# Patient Record
Sex: FEMALE | Race: BLACK OR AFRICAN AMERICAN | NOT HISPANIC OR LATINO | Employment: UNEMPLOYED | URBAN - METROPOLITAN AREA
[De-identification: names, ages, dates, MRNs, and addresses within clinical notes are randomized per-mention and may not be internally consistent; named-entity substitution may affect disease eponyms.]

---

## 2018-11-06 ENCOUNTER — IMMUNIZATION (OUTPATIENT)
Dept: PHARMACY | Facility: CLINIC | Age: 53
End: 2018-11-06

## 2020-03-06 NOTE — PROGRESS NOTES
"ANNUAL VISIT NOTE     PRESENTING HISTORY     Reason for Visit:  Annual visit.    No chief complaint on file.      History of Present Illness & ROS: Ms. Martha Zarate is a 54 y.o. female.  Here for an Annual.   New to this provider and clinic. Former patient of [facility], but "need to change to [facility] due to them no longer accepting my insurance". She has reportedly had her labs (routine annuals) in December with her former primary at  [facility]; checked her portal on her phone and noted to have a cholesterol of 235 in 10/2019. Not on "cholesterol" medication.     She is not having any new or acute issues.   She has chronic "arthritic" problems. Reportedly not being seen by a Rheumatologist at [facility].       Review of Systems:  Eyes: denies visual changes at this time denies floaters   ENT: no nasal congestion or sore throat  Respiratory: no cough or shorness of breath  Cardiovascular: no chest pain or palpitations  Gastrointestinal: no nausea or vomiting, no abdominal pain or change in bowel habits  Genitourinary: no hematuria or dysuria; denies frequency  Hematologic/Lymphatic: no easy bruising or lymphadenopathy  Musculoskeletal: no arthralgias or myalgias  Neurological: no seizures or tremors  Endocrine: no heat or cold intolerance    PAST HISTORY:     No past medical history on file.    Past Surgical History:   Procedure Laterality Date    TUBAL LIGATION         Family History   Problem Relation Age of Onset    Diabetes Father     Heart disease Father     Hypertension Father        Social History     Socioeconomic History    Marital status:      Spouse name: Not on file    Number of children: Not on file    Years of education: Not on file    Highest education level: Not on file   Occupational History    Not on file   Social Needs    Financial resource strain: Not on file    Food insecurity:     Worry: Not on file     Inability: Not on file    Transportation needs:     Medical: Not on file     " Non-medical: Not on file   Tobacco Use    Smoking status: Never Smoker   Substance and Sexual Activity    Alcohol use: No    Drug use: No    Sexual activity: Yes     Birth control/protection: None   Lifestyle    Physical activity:     Days per week: Not on file     Minutes per session: Not on file    Stress: Not on file   Relationships    Social connections:     Talks on phone: Not on file     Gets together: Not on file     Attends [employer] service: Not on file     Active member of club or organization: Not on file     Attends meetings of clubs or organizations: Not on file     Relationship status: Not on file   Other Topics Concern    Not on file   Social History Narrative    Not on file       MEDICATIONS & ALLERGIES:     Current Outpatient Medications on File Prior to Visit   Medication Sig Dispense Refill    flu vac [id number] 36mos up,PF, 60 mcg (15 mcg x 4)/0.5 mL Syrg to be administered by pharmacist 0.5 mL 0     No current facility-administered medications on file prior to visit.         Review of patient's allergies indicates:  No Known Allergies    Medications Reconciliation:   I have reconciled the patient's home medications and discharge medications with the patient/family. I have updated all changes.  Refer to After-Visit Medication List.    OBJECTIVE:     Vital Signs:  There were no vitals filed for this visit.  Wt Readings from Last 1 Encounters:   07/10/13 1346 104.3 kg (230 lb)     There is no height or weight on file to calculate BMI.     Wt Readings from Last 3 Encounters:   03/10/20 114.4 kg (252 lb 3.3 oz)   07/10/13 104.3 kg (230 lb)     Temp Readings from Last 3 Encounters:   07/10/13 98.2 °F (36.8 °C) (Oral)     BP Readings from Last 3 Encounters:   03/10/20 130/80   07/10/13 139/78     Pulse Readings from Last 3 Encounters:   03/10/20 60   07/10/13 92       Physical Exam:  General: Well developed, well nourished. No distress.  HEENT: Head is normocephalic, atraumatic; ears are  normal.   Eyes: Clear conjunctiva.  Neck: Supple, symmetrical neck; trachea midline.  Lungs: Clear to auscultation bilaterally and normal respiratory effort.  Cardiovascular: Heart with regular rate and rhythm. No murmurs, gallops or rubs  Extremities: No LE edema. Pulses 2+ and symmetric.   Abdomen: Abdomen is soft, non-tender non-distended with normal bowel sounds.  Skin: Skin color, texture, turgor normal. No rashes.  Musculoskeletal: Normal gait.   Lymph Nodes: No cervical or supraclavicular adenopathy.  Neurologic: Normal strength and tone. No focal numbness or weakness.   Psychiatric: Not depressed.    Laboratory  No results found for: WBC, HGB, HCT, PLT, CHOL, TRIG, HDL, LDLDIRECT, ALT, AST, NA, K, CL, CREATININE, BUN, CO2, TSH, PSA, INR, GLUF, HGBA1C, MICROALBUR        ASSESSMENT & PLAN:       Preventative health care  -     Comprehensive metabolic panel; Future; Expected date: 03/10/2020  -     CBC auto differential; Future; Expected date: 03/10/2020  -     Lipid panel; Future; Expected date: 03/10/2020  -     Hemoglobin A1c; Future; Expected date: 03/10/2020  -     Iron and TIBC; Future; Expected date: 03/10/2020  -     Ferritin; Future; Expected date: 03/10/2020    Iron deficiency anemia, unspecified iron deficiency anemia type:   ` continue Iron supplements daily (not taking consistently)  -     Iron and TIBC; Future; Expected date: 03/10/2020  -     Ferritin; Future; Expected date: 03/10/2020      Hyperlipidemia, unspecified hyperlipidemia type:   Noted on lab portal with [facility] and former primary that her Cholesterol level was 235   -     Lipid panel; Future; Expected date: 03/10/2020  *Taking [place]       Immunizations:   Shingrix Series: future   TDaP: order given       *Will need to schedule with one of our Primary Care Physician providers to be considered fully est'd in medical care.   Noted appt on 4/22/2020 with Dr. Langley     Future Appointments   Date Time Provider Department Center   4/22/2020   9:00 AM Layne Langley MD [facility]        Medication List           Accurate as of March 10, 2020 10:21 AM. If you have any questions, ask your nurse or doctor.               CONTINUE taking these medications    ferrous gluconate 324 MG tablet  Commonly known as:  FERGON     FLUZONE QUAD [id number] (PF) 60 mcg (15 mcg x 4)/0.5 mL Syrg  Generic drug:  flu vac [id number] 36mos up(PF)  to be administered by pharmacist          Signing Physician:  DAVE Gomez

## 2020-03-10 ENCOUNTER — OFFICE VISIT (OUTPATIENT)
Dept: INTERNAL MEDICINE | Facility: CLINIC | Age: 55
End: 2020-03-10
Payer: MEDICARE

## 2020-03-10 ENCOUNTER — IMMUNIZATION (OUTPATIENT)
Dept: PHARMACY | Facility: CLINIC | Age: 55
End: 2020-03-10
Payer: MEDICARE

## 2020-03-10 VITALS
HEART RATE: 60 BPM | DIASTOLIC BLOOD PRESSURE: 80 MMHG | HEIGHT: 65 IN | SYSTOLIC BLOOD PRESSURE: 130 MMHG | OXYGEN SATURATION: 95 % | BODY MASS INDEX: 42.02 KG/M2 | WEIGHT: 252.19 LBS

## 2020-03-10 DIAGNOSIS — R79.9 ABNORMAL FINDING OF BLOOD CHEMISTRY, UNSPECIFIED: ICD-10-CM

## 2020-03-10 DIAGNOSIS — D50.9 IRON DEFICIENCY ANEMIA, UNSPECIFIED IRON DEFICIENCY ANEMIA TYPE: ICD-10-CM

## 2020-03-10 DIAGNOSIS — E78.5 HYPERLIPIDEMIA, UNSPECIFIED HYPERLIPIDEMIA TYPE: ICD-10-CM

## 2020-03-10 DIAGNOSIS — Z00.00 PREVENTATIVE HEALTH CARE: Primary | ICD-10-CM

## 2020-03-10 PROCEDURE — 99386 PR PREVENTIVE VISIT,NEW,40-64: ICD-10-PCS | Mod: S$GLB,,, | Performed by: NURSE PRACTITIONER

## 2020-03-10 PROCEDURE — 99999 PR PBB SHADOW E&M-EST. PATIENT-LVL III: CPT | Mod: PBBFAC,,, | Performed by: NURSE PRACTITIONER

## 2020-03-10 PROCEDURE — 99999 PR PBB SHADOW E&M-EST. PATIENT-LVL III: ICD-10-PCS | Mod: PBBFAC,,, | Performed by: NURSE PRACTITIONER

## 2020-03-10 PROCEDURE — 99386 PREV VISIT NEW AGE 40-64: CPT | Mod: S$GLB,,, | Performed by: NURSE PRACTITIONER

## 2020-03-10 RX ORDER — FERROUS GLUCONATE 324(38)MG
TABLET ORAL
COMMUNITY
Start: 2019-12-04 | End: 2020-08-26 | Stop reason: SDUPTHER

## 2020-03-11 ENCOUNTER — TELEPHONE (OUTPATIENT)
Dept: INTERNAL MEDICINE | Facility: CLINIC | Age: 55
End: 2020-03-11

## 2020-03-11 NOTE — TELEPHONE ENCOUNTER
"JAMIR:   Start Iron tabs daily     Hypercholesteremia:   238 with high HDL  Suspect this is diet driven and have advised on diet and exercise with repeat in 3-4 months when follows up with "new" primary care provider, Dr. Langley to [facility].     SDJ        "

## 2020-03-12 ENCOUNTER — TELEPHONE (OUTPATIENT)
Dept: INTERNAL MEDICINE | Facility: CLINIC | Age: 55
End: 2020-03-12

## 2020-03-12 NOTE — TELEPHONE ENCOUNTER
Pt informed of results and to take ferrous sulfate 325mg daily OTC. Also informed pt to keep appt with Dr. Langley in April. Pt verbally understood.

## 2020-03-12 NOTE — TELEPHONE ENCOUNTER
"----- Message from DAVE Andrew sent at 3/11/2020  6:29 AM CDT -----    Patient does not have a portal set up.   Please call her with the following instructions from labs drawn on yesterday..    1) Iron Def Anemia:   Iron level 7`    ` start Ferrous Sulfate 325mg tab daily (over the counter) for a  Minimum of 3 months    2) Hypercholesteremia:   238 with high HDL  Suspect this is diet driven and adviseon diet and exercise with repeat in 3-4 months when follows up with "new" primary care provider, Dr. Langley to [facility].     Thanks     "

## 2020-06-03 ENCOUNTER — IMMUNIZATION (OUTPATIENT)
Dept: PHARMACY | Facility: CLINIC | Age: 55
End: 2020-06-03
Payer: MEDICARE

## 2020-06-15 PROBLEM — Z00.00 PREVENTATIVE HEALTH CARE: Status: RESOLVED | Noted: 2020-03-10 | Resolved: 2020-06-15

## 2020-06-18 ENCOUNTER — PATIENT OUTREACH (OUTPATIENT)
Dept: ADMINISTRATIVE | Facility: HOSPITAL | Age: 55
End: 2020-06-18

## 2020-08-26 ENCOUNTER — OFFICE VISIT (OUTPATIENT)
Dept: PRIMARY CARE CLINIC | Facility: CLINIC | Age: 55
End: 2020-08-26
Payer: MEDICARE

## 2020-08-26 VITALS
HEART RATE: 87 BPM | DIASTOLIC BLOOD PRESSURE: 72 MMHG | HEIGHT: 64 IN | BODY MASS INDEX: 43.77 KG/M2 | OXYGEN SATURATION: 99 % | TEMPERATURE: 99 F | SYSTOLIC BLOOD PRESSURE: 126 MMHG | WEIGHT: 256.38 LBS

## 2020-08-26 DIAGNOSIS — K59.03 DRUG-INDUCED CONSTIPATION: ICD-10-CM

## 2020-08-26 DIAGNOSIS — Z76.89 ESTABLISHING CARE WITH NEW DOCTOR, ENCOUNTER FOR: Primary | ICD-10-CM

## 2020-08-26 DIAGNOSIS — D50.8 IRON DEFICIENCY ANEMIA SECONDARY TO INADEQUATE DIETARY IRON INTAKE: ICD-10-CM

## 2020-08-26 DIAGNOSIS — M17.0 PRIMARY OSTEOARTHRITIS OF BOTH KNEES: ICD-10-CM

## 2020-08-26 DIAGNOSIS — Z53.20 HIV SCREENING DECLINED: ICD-10-CM

## 2020-08-26 DIAGNOSIS — E78.00 HYPERCHOLESTEREMIA: ICD-10-CM

## 2020-08-26 DIAGNOSIS — E66.01 CLASS 3 SEVERE OBESITY DUE TO EXCESS CALORIES WITHOUT SERIOUS COMORBIDITY WITH BODY MASS INDEX (BMI) OF 40.0 TO 44.9 IN ADULT: ICD-10-CM

## 2020-08-26 DIAGNOSIS — Z00.00 LABORATORY EXAM ORDERED AS PART OF ROUTINE GENERAL MEDICAL EXAMINATION: ICD-10-CM

## 2020-08-26 DIAGNOSIS — Z12.31 SCREENING MAMMOGRAM, ENCOUNTER FOR: ICD-10-CM

## 2020-08-26 DIAGNOSIS — Z86.39 PERSONAL HISTORY OF OTHER ENDOCRINE, NUTRITIONAL AND METABOLIC DISEASE: ICD-10-CM

## 2020-08-26 PROBLEM — K59.04 CHRONIC IDIOPATHIC CONSTIPATION: Status: ACTIVE | Noted: 2020-08-26

## 2020-08-26 PROBLEM — E66.813 CLASS 3 SEVERE OBESITY DUE TO EXCESS CALORIES WITHOUT SERIOUS COMORBIDITY WITH BODY MASS INDEX (BMI) OF 40.0 TO 44.9 IN ADULT: Status: ACTIVE | Noted: 2020-08-26

## 2020-08-26 PROCEDURE — 99999 PR PBB SHADOW E&M-EST. PATIENT-LVL V: ICD-10-PCS | Mod: PBBFAC,,, | Performed by: FAMILY MEDICINE

## 2020-08-26 PROCEDURE — 99499 UNLISTED E&M SERVICE: CPT | Mod: S$GLB,,, | Performed by: FAMILY MEDICINE

## 2020-08-26 PROCEDURE — 3008F PR BODY MASS INDEX (BMI) DOCUMENTED: ICD-10-PCS | Mod: CPTII,S$GLB,, | Performed by: FAMILY MEDICINE

## 2020-08-26 PROCEDURE — 3008F BODY MASS INDEX DOCD: CPT | Mod: CPTII,S$GLB,, | Performed by: FAMILY MEDICINE

## 2020-08-26 PROCEDURE — 99499 RISK ADDL DX/OHS AUDIT: ICD-10-PCS | Mod: S$GLB,,, | Performed by: FAMILY MEDICINE

## 2020-08-26 PROCEDURE — 99214 OFFICE O/P EST MOD 30 MIN: CPT | Mod: S$GLB,,, | Performed by: FAMILY MEDICINE

## 2020-08-26 PROCEDURE — 99214 PR OFFICE/OUTPT VISIT, EST, LEVL IV, 30-39 MIN: ICD-10-PCS | Mod: S$GLB,,, | Performed by: FAMILY MEDICINE

## 2020-08-26 PROCEDURE — 99999 PR PBB SHADOW E&M-EST. PATIENT-LVL V: CPT | Mod: PBBFAC,,, | Performed by: FAMILY MEDICINE

## 2020-08-26 RX ORDER — DOCUSATE SODIUM 100 MG/1
100 CAPSULE, LIQUID FILLED ORAL
COMMUNITY
Start: 2019-12-03 | End: 2020-08-26 | Stop reason: SDUPTHER

## 2020-08-26 RX ORDER — DOCUSATE SODIUM 100 MG/1
100 CAPSULE, LIQUID FILLED ORAL DAILY PRN
Qty: 90 CAPSULE | Refills: 3 | Status: SHIPPED | OUTPATIENT
Start: 2020-08-26 | End: 2021-08-26

## 2020-08-26 RX ORDER — FERROUS GLUCONATE 324(38)MG
324 TABLET ORAL
Qty: 90 TABLET | Refills: 3 | Status: SHIPPED | OUTPATIENT
Start: 2020-08-26 | End: 2021-03-16

## 2020-08-26 NOTE — PATIENT INSTRUCTIONS
Weight Management: Call [phone] or [phone] to schedule.       Making Bariatric Surgery Work for You    After bariatric surgery, success is in your hands. The changes you make need to be lifelong commitments. Follow any instructions you are given on nutrition and activity. Be aware that how you see yourself and how others see you may change. Turn to those close to you for support. They can help you adjust to your new life.  What to expect as you lose weight  Most likely, you will lose weight steadily for the first 6 to 12 months after surgery. The most rapid weight loss often happens during the first 6 months after surgery. Most patients lose over half their excess weight in the first year and a half. After that, you may gain a small amount of weight back. This is normal. Set realistic and meaningful goals for weight loss. Most likely, you wont reach your ideal weight. But youll reach a healthier weight.  Changing your eating habits  To stay healthy, you may be given guidelines such as:  · Choose high-protein foods to help prevent nutritional problems.  · Eat slowly. Take small bites. Chew each bite well before swallowing it.  · Stop eating when you feel satisfied. Try not to reach that full feeling. Doing so can stretch the bariatric surgical procedure and allow you to eat more.   · If you want to snack between scheduled meals, talk with your dietitian about healthy snack choices.  · Drink sugar-free liquids, such as water. Drink them between (not with) meals. Wait at least 30 to 60 minutes after meals before drinking liquids.  · Take vitamins as directed.  · Avoid fibrous foods, such as celery, string beans, and unprocessed meat.  · Avoid alcohol and carbonated drinks.  Having an active lifestyle  These tips can help you succeed:  · Choose a form of regular exercise you enjoy.  · Exercise at your own pace.  · Ask a friend to join you.  · Keep a record of your exercise activity in a calendar or  notebook. Some people find this a good way to track their progress and stay motivated.  Finding support  See your bariatric surgery team on a regular basis, especially during the first year after surgery.   You might talk to:  · Friends and family members.  · Other bariatric surgery patients. Often they know just what youre going through. You may find other patients through a support group at your bariatric surgery program. Or there may be a group in your local community.  · A mental health professional. If you spoke to one before surgery, you might seek him or her out again. Special counseling or classes may be suggested.  Resources  · American Society for Metabolic and Bariatric Surgerywww.asmbs.org  · National Heart, Lung, and Blood [place] Obesity Education Initiativewww.nhlbi.nih.gov/health/public/heart/obesity/lose_wt   Date Last Reviewed: 3/23/2016  © 9078-9708 [employer]. [address] [place] [address]. All rights reserved. This information is not intended as a substitute for professional medical care. Always follow your healthcare professional's instructions.        Deciding on Bariatric Surgery    Is excess weight affecting your life and your health? Bariatric surgery (also called obesity surgery) may help you reach a healthier weight. This surgery alters your digestive system. For the surgery to work, you must change your diet and lifestyle. In most cases, the surgery is not reversible. So if youre considering surgery, learn all you can about it before you decide. Bariatric surgery also has a number of potential risks and complications that you need to discuss with your surgeon.  Qualifying for surgery  Surgery is not for everyone. To qualify:  · You must have a BMI of 40 or more, or a BMI of 35 or more (see box below) plus a serious obesity-related health problem, such as type 2 diabetes, high blood pressure, or sleep apnea.  · You must be healthy enough to have  surgery.  · You may be required to have a psychological evaluation.  · You must have tried to lose weight by other means, such as dieting.  Setting realistic expectations  The goal of bariatric surgery is to help you lose over half of your excess weight. This can improve or prevent health problems. This surgery is not done for cosmetic reasons. Keep in mind that:  · Other weight-loss methods should be tried first. Lifestyle changes, behavioral modifications, and prescription medicines are initial choices. Surgery is only a choice if other methods dont work.  · Surgery is meant to be permanent. You will need to change how you eat for the rest of your life.  · You must commit to eating less and being more active after surgery. If you dont, you will not lose or keep off the weight.  · You wont reach a healthy weight right away. Most weight is lost steadily during the first year or two after surgery.  · Most likely, you wont lose all your excess weight. But you can reach a much healthier weight.  Obesity is measured by a formula called body mass index (BMI), which is based on your height and weight. A healthy BMI is about 18 to 25. A BMI of 30 or more signals obesity. A BMI of 40 or more reflects severe (morbid) obesity.   Resources  · American Society for Metabolic and Bariatric Surgerywww.asmbs.org  · National Heart, Lung, and Blood [place] Obesity Education Initiativewww.nhlbi.nih.gov/health/public/heart/obesity/lose_wt  Date Last Reviewed: 2/17/2016  © 0042-4052 [employer]. [address] [place] [address], [place] [address]. All rights reserved. This information is not intended as a substitute for professional medical care. Always follow your healthcare professional's instructions.        Losing Weight for Heart Health  Excess weight is a major risk factor for heart disease. Losing weight has many benefits including lowering your blood pressure, improving your cholesterol level, and decreasing your risk  for diseases such as diabetes and heart disease. It may help keep your arteries open so that your heart can get the oxygen-rich blood it needs. All in all, losing weight makes you healthier.     Exercise with a friend. When activity is fun, you're more likely to stick with it.   Calories and weight loss  · Calories are the fuel your body burns for energy. You get the calories you need from the food you eat. For healthy weight loss, women should eat at least 1,200 calories a day, men at least 1,500.  · When you eat more calories than you need, your body stores the extra calories as fat. One pound of fat equals 3,500 calories.  · To lose weight, try to reduce your total calorie intake by 500 calories. To do this, eat 250 calories less each day. Add activity to burn the other 250 calories. Walking 2.5 miles burns about 250 calories. Other more intense activities can burn more calories in the time you spend doing them, such as swimming and running. It is important to understand that reducing calorie intake is much more effective at weight loss than is exercise.  · Eat a variety of healthy foods to get the nutrients you need.  Tips for losing weight  · Drink 8 to 10 glasses of water a day.  · Dont skip meals. Instead, eat smaller portions.  · Eat your meals earlier in the day.  · Cut out sugary drinks such as soda and fruit juices.  · Make your later meals lighter than your earlier meals.   Brisk activity is best  Brisk activity gets your heart pumping faster and it makes it healthier. Its also a great way to burn calories. In fact, your body may keep burning calories for hours after you stop a brisk activity:  · Begin by walking 10 minutes most days.  · Add more time and speed to your walk. Build up as you feel able.  · Aim for 3 to 4 sessions of aerobic exercise a week. Each session should last about 40 minutes and include moderate to vigorous physical activity.  · The most important part of the activity is that you  break a sweat. This indicates your heart is working hard enough to burn fat.  Date Last Reviewed: 6/1/2016 © 2000-2017 [employer]. [address] [place] [address], [place] [address]. All rights reserved. This information is not intended as a substitute for professional medical care. Always follow your healthcare professional's instructions.        Weight Management: Exercise and Activity    Studies show that people who exercise are the most likely to lose weight and keep it off. Exercise burns calories. It helps build muscle to make your body stronger. Make exercise an important part of your weight-management plan.  Make activity part of your day  You may not think you have the time to exercise. But you can work activity into your daily life--you just need to be committed. Take 10 minutes out of your lunch hour to take a walk. Walk to the [employer] to get your paper instead of having it delivered. Make it a habit to take the stairs instead of the elevator. Park in a far away parking spot instead of the closest. Youll be surprised at how fast these little changes can make a difference.  Some people really cannot walk very far, and tire out quickly with exercise. Instead of becoming discouraged, resolve to do what you can do, and work to make that a regular frequent habit.   The benefits of exercise  Exercise offers many benefits including:   · Exercise increases your metabolism (the speed at which your body burns calories).  · Regular exercise can increase the amount of muscle in your body. Muscle burns calories faster than fat. The more muscle you have, the more calories you burn.  · Exercise gives you energy and curbs your appetite.  · Exercise decreases stress and helps you sleep better.  Make exercise fun  Exercise can be fun. Choose an activity you enjoy. You may even get a friend to do it with you:  · Take a resistance-training or aerobics class  · Join a team sport  · Take a dance class  · Walk the  dog  · Ride a bike  If you have health problems, be sure to ask your healthcare provider before you start an exercise program. Have a  help you develop a plan thats safe for you.   Date Last Reviewed: 2/4/2016 © 2000-2017 [employer]. [address] [place] [address]. All rights reserved. This information is not intended as a substitute for professional medical care. Always follow your healthcare professional's instructions.        Finding Your Ideal Weight  Most of the people in magazines and on TV are far slimmer than average, yet this is the ideal that many people aim for. Before you decide that you wont be happy until you get down to a certain number of pounds, consider:    · Your age. You probably wish you could get back to your college weight. But normal changes in metabolism and hormone levels that occur with aging can make this more difficult.  · Your gender. In general, men have more muscle and heavier bones than women, which means that healthy men usually weigh more than healthy women of the same height.  · Your current weight. If you are very heavy, focus on losing a smaller amount (such as 10 percent of your body weight). Losing just 5 to 10 pounds can improve your health.  Your body fat percentage  A pound of muscle weighs the same as a pound of fat, but it takes up less space. Think of a trained athlete and a couch potato. Even though they may be the same height and weight, the athlete looks fitter, is healthier, and probably wears a smaller size of clothing. If you are muscular, a body fat test may be a more accurate measure of your ideal weight than the bathroom scale. Talk to your healthcare provider, who can help you set appropriate goals for yourself.  Body mass index (BMI)  Your body mass index is a number calculated from your weight and height. It is a fairly reliable indivactor of body fatness for most people. To calculate your BMI, see this BMI  calculator from the CDC: http://www.cdc.gov/healthyweight/assessing/bmi/adult_bmi/english_bmi_calculator/bmi_calculator.html  Date Last Reviewed: 6/1/2015 © 2000-2017 [employer]. [address] [place] [address]. All rights reserved. This information is not intended as a substitute for professional medical care. Always follow your healthcare professional's instructions.        Understanding Food and Cholesterol  Having a high cholesterol level puts you at risk for heart disease and other health problems. What you eat has a big effect on your bodys cholesterol level. Eating certain foods can raise your cholesterol. Other foods can help you lower it. Watching what you eat can help you get your cholesterol level under control.  Know which foods are high in saturated fat and trans fat  Foods high in saturated fat and trans fat can raise your LDL (bad) cholesterol. Its important to knowing which foods are high in these fats, and eat less of them. This can help you manage your cholesterol levels.  Foods high in these fats are:  · Animal products, including beef, lamb, pork, and poultry with skin on  · Cold cuts, guthrie, sausage  · Creamy sauces and fatty gravies  · Cookies, donuts, muffins, and pastries  · Fried foods  · Shortening, butter, coconut oil, palm oil, cocoa butter, partially hydrogenated oils (read labels)  · High-fat dairy products such as whole milk, cheese, cream cheese, and ice cream  Better choices:  · Lean beef, skinless white-meat poultry, fish  · Tomato sauce, vegetable puree  · Dried fruit, bagels, bread with jam  · Baked, broiled, steamed, or roasted foods  · Soft (tub) margarine, canola oil, and olive oil in moderate amounts  · Low-fat or nonfat dairy products, such as 1% or fat-free milk, and reduced-fat cheese  Use fiber to help control cholesterol  Foods high in fiber can help you keep your cholesterol down. Good sources of fiber are:  · Oats  · Barley  · Whole  grains  · Beans  · Vegetables  · Cornmeal  · Popcorn  · Berries, apples, other fruits  Date Last Reviewed: 8/10/2015  © 9577-6727 [employer], [employer]. [address] [place] [address], [place] [address]. All rights reserved. This information is not intended as a substitute for professional medical care. Always follow your healthcare professional's instructions.

## 2020-08-26 NOTE — PROGRESS NOTES
Subjective:       Patient ID: Martha Zarate is a 54 y.o. female.    Chief Complaint: [facility]      55 yo female presents to establish care. She states that she is on disability for arthritis of knee. She has iron deficiency anemia and needs refill on iron. Take colace for drug induced constipation on iron replacement. Reports that her periods are not consistently heavy. She follows routinely with gynecology.    The following portions of the patient's history were reviewed and updated as appropriate: allergies, current medications, past family history, past medical history, past social history, past surgical history and problem list.    Review of Systems   Constitutional: Negative for activity change, appetite change, chills, diaphoresis, fatigue, fever and unexpected weight change.   HENT: Negative for nasal congestion, dental problem, facial swelling, nosebleeds, postnasal drip, rhinorrhea, sore throat, tinnitus and trouble swallowing.    Eyes: Negative for pain, discharge, itching and visual disturbance.   Respiratory: Negative for apnea, chest tightness, shortness of breath, wheezing and stridor.    Cardiovascular: Negative for chest pain, palpitations and leg swelling.   Gastrointestinal: Negative for abdominal distention, abdominal pain, constipation, diarrhea, nausea, rectal pain and vomiting.   Endocrine: Negative for cold intolerance, heat intolerance and polyuria.   Genitourinary: Negative for difficulty urinating, dysuria, frequency, hematuria and urgency.   Musculoskeletal: Negative for arthralgias, gait problem, myalgias, neck pain and neck stiffness.   Integumentary:  Negative for color change and rash.   Neurological: Negative for dizziness, tremors, seizures, syncope, facial asymmetry, weakness and headaches.   Hematological: Negative for adenopathy. Does not bruise/bleed easily.   Psychiatric/Behavioral: Negative for agitation, confusion, hallucinations, self-injury and suicidal ideas. The  "patient is not hyperactive.           Objective:       Vitals:    08/26/20 0850   BP: 126/72   Pulse: 87   Temp: 98.6 °F (37 °C)   TempSrc: Oral   SpO2: 99%   Weight: 116.3 kg (256 lb 6.3 oz)   Height: 5' 4" (1.626 m)     Physical Exam  Constitutional:       General: She is not in acute distress.     Appearance: She is well-developed. She is obese. She is not diaphoretic.   HENT:      Head: Normocephalic and atraumatic.      Right Ear: External ear normal.      Left Ear: External ear normal.   Eyes:      General: No scleral icterus.        Right eye: No discharge.         Left eye: No discharge.      Conjunctiva/sclera: Conjunctivae normal.      Pupils: Pupils are equal, round, and reactive to light.   Neck:      Musculoskeletal: Normal range of motion and neck supple.      Thyroid: No thyromegaly.   Cardiovascular:      Rate and Rhythm: Normal rate and regular rhythm.      Heart sounds: Normal heart sounds. No murmur. No friction rub. No gallop.    Pulmonary:      Effort: Pulmonary effort is normal. No respiratory distress.      Breath sounds: Normal breath sounds.   Chest:      Chest wall: No tenderness.   Abdominal:      General: Bowel sounds are normal. There is no distension.      Palpations: Abdomen is soft. There is no mass.      Tenderness: There is no abdominal tenderness. There is no guarding or rebound.   Musculoskeletal: Normal range of motion.      Comments: No swelling or deformity of upper or lower extremity particular knee joints.   Lymphadenopathy:      Cervical: No cervical adenopathy.   Skin:     General: Skin is warm.      Capillary Refill: Capillary refill takes less than 2 seconds.      Findings: No rash.   Neurological:      Mental Status: She is alert and oriented to person, place, and time.      Cranial Nerves: No cranial nerve deficit.      Motor: No abnormal muscle tone.      Coordination: Coordination normal.      Deep Tendon Reflexes: Reflexes normal.   Psychiatric:         Thought " Content: Thought content normal.         Judgment: Judgment normal.         Assessment:       1. Establishing care with new doctor, encounter for    2. Primary osteoarthritis of both knees    3. Class 3 severe obesity due to excess calories without serious comorbidity with body mass index (BMI) of 40.0 to 44.9 in adult    4. Drug-induced constipation    5. Iron deficiency anemia secondary to inadequate dietary iron intake    6. Hypercholesteremia    7. Personal history of other endocrine, nutritional and metabolic disease     8. Laboratory exam ordered as part of routine general medical examination    9. HIV screening declined    10. Screening mammogram, encounter for        Plan:         1. Establishing care with new doctor, encounter for  The patient has been advised of the reasonably likely side effects and complications of medications and treatment.  Medications were reviewed and reconciled with refills sent to pharmacy as needed. Health maintenance was reviewed with recommendations per age and sex.  Immunizations reviewed and updated per guidelines unless patient declines. All questions were addressed and answered. Patient has contact information to get hold of us as needed and is encouraged to use the patient portal system.      2. Primary osteoarthritis of both knees  Activity modification. Heat/ cold therapy; topical Biofreeze, Aspercreme, Lidocaine OTC patches, Diclofenac gel. Tylenol and/or NSAIDs as needed. Careful with GI and renal adverse events with NSAIDs. Weight loss advised.  Exercises for strengthening and increased range of motion; supervised vs home vs combination physical therapy. May consider imaging and/or specialist consultation if symptoms do not improve.    She does not ambulate with assistance.    3. Class 3 severe obesity due to excess calories without serious comorbidity with body mass index (BMI) of 40.0 to 44.9 in adult  -     Ambulatory referral/consult to Weight Management Program;  Future  -     CBC Without Differential; Future  The importance of optimum diet & exercise discussed. The goals for weight management of 5-10 % of total body weight reduction in 3 months addressed.     The consumption of a reduced calorie diet, frequent self-weighing, and participation in a lifestyle intervention program are strategies to help maintain weight loss. Bariatric surgery, which may alter the body's adipose tissue set point, and extended use of anti-obesity pharmacologic therapy may help address these underlying physiologic changes.     4. Drug induced constipation  -     docusate sodium (COLACE) 100 MG capsule; Take 1 capsule (100 mg total) by mouth daily as needed for Constipation.  Dispense: 90 capsule; Refill: 3    5. Iron deficiency anemia secondary to inadequate dietary iron intake  -     Iron and TIBC; Future  -     Ferritin; Future  -     ferrous gluconate (FERGON) 324 MG tablet; Take 1 tablet (324 mg total) by mouth daily with breakfast.  Dispense: 90 tablet; Refill: 3    6. Hypercholesteremia  -     Lipid Panel; Future    7. Personal history of other endocrine, nutritional and metabolic disease   -     Hemoglobin A1C; Future  -     TSH; Future    8. Laboratory exam ordered as part of routine general medical examination  -     Comprehensive metabolic panel; Future  -     Hemoglobin A1C; Future    9. HIV screening declined  No high risk behavior.    10. Screening mammogram, encounter for  -     Mammo Digital Screening Bilat w/ Rigoberto; Future; Expected date: 01/16/2021    Annual physical on schedule for March, 2021, return sooner if any concerns.  Attempt to get colonoscopy records; Colonoscopy from [facility] or ask PCP Dr. Pamela Calle at [facility] [place]     Disclaimer: This note has been generated using voice-recognition software. There may be typographical errors that have been missed during proof-reading

## 2020-08-28 ENCOUNTER — PATIENT OUTREACH (OUTPATIENT)
Dept: ADMINISTRATIVE | Facility: HOSPITAL | Age: 55
End: 2020-08-28

## 2020-08-28 NOTE — LETTER
AUTHORIZATION FOR RELEASE OF   CONFIDENTIAL INFORMATION    Dear Dr. Sheridan,    We are seeing Martha Zarate, date of birth 1965, in the clinic at [facility]. Gertrude Kaplan MD is the patient's PCP. Martha Zarate has an outstanding lab/procedure at the time we reviewed her chart. In order to help keep her health information updated, she has authorized us to request the following medical record(s):        (  )  MAMMOGRAM                                      ( X )  COLONOSCOPY      (  )  PAP SMEAR                                          (  )  OUTSIDE LAB RESULTS     (  )  DEXA SCAN                                          (  )  EYE EXAM            (  )  FOOT EXAM                                          (  )  ENTIRE RECORD     (  )  OUTSIDE IMMUNIZATIONS                 (  )  _______________         Please fax records to [facility], Tenille Ottley-Sharpe, MD, [phone]     If you have any questions, please contact Naz at [phone].           Patient Name: Martha Zarate  : 1965  Patient Phone #: [phone]

## 2020-10-30 ENCOUNTER — PATIENT MESSAGE (OUTPATIENT)
Dept: ADMINISTRATIVE | Facility: HOSPITAL | Age: 55
End: 2020-10-30

## 2020-12-07 ENCOUNTER — PATIENT MESSAGE (OUTPATIENT)
Dept: ADMINISTRATIVE | Facility: HOSPITAL | Age: 55
End: 2020-12-07

## 2021-01-16 ENCOUNTER — LAB VISIT (OUTPATIENT)
Dept: LAB | Facility: HOSPITAL | Age: 56
End: 2021-01-16
Attending: FAMILY MEDICINE
Payer: MEDICARE

## 2021-01-16 DIAGNOSIS — Z12.12 ENCOUNTER FOR COLORECTAL CANCER SCREENING: ICD-10-CM

## 2021-01-16 DIAGNOSIS — Z12.11 ENCOUNTER FOR COLORECTAL CANCER SCREENING: ICD-10-CM

## 2021-01-16 PROCEDURE — 82274 ASSAY TEST FOR BLOOD FECAL: CPT

## 2021-01-19 ENCOUNTER — HOSPITAL ENCOUNTER (OUTPATIENT)
Dept: RADIOLOGY | Facility: HOSPITAL | Age: 56
Discharge: HOME | End: 2021-01-19
Attending: FAMILY MEDICINE
Payer: MEDICARE

## 2021-01-19 DIAGNOSIS — Z12.31 SCREENING MAMMOGRAM, ENCOUNTER FOR: ICD-10-CM

## 2021-01-19 PROCEDURE — 77067 MAMMO DIGITAL SCREENING BILAT WITH TOMOSYNTHESIS_CAD: ICD-10-PCS | Mod: 26,,, | Performed by: RADIOLOGY

## 2021-01-19 PROCEDURE — 77063 MAMMO DIGITAL SCREENING BILAT WITH TOMOSYNTHESIS_CAD: ICD-10-PCS | Mod: 26,,, | Performed by: RADIOLOGY

## 2021-01-19 PROCEDURE — 77063 BREAST TOMOSYNTHESIS BI: CPT | Mod: 26,,, | Performed by: RADIOLOGY

## 2021-01-19 PROCEDURE — 77067 SCR MAMMO BI INCL CAD: CPT | Mod: 26,,, | Performed by: RADIOLOGY

## 2021-01-19 PROCEDURE — 77067 SCR MAMMO BI INCL CAD: CPT | Mod: TC,PN

## 2021-01-28 ENCOUNTER — PATIENT OUTREACH (OUTPATIENT)
Dept: ADMINISTRATIVE | Facility: HOSPITAL | Age: 56
End: 2021-01-28

## 2021-01-28 DIAGNOSIS — Z12.12 ENCOUNTER FOR COLORECTAL CANCER SCREENING: Primary | ICD-10-CM

## 2021-01-28 DIAGNOSIS — Z12.11 ENCOUNTER FOR COLORECTAL CANCER SCREENING: Primary | ICD-10-CM

## 2021-02-03 LAB — HEMOCCULT STL QL IA: NEGATIVE

## 2021-02-04 ENCOUNTER — TELEPHONE (OUTPATIENT)
Dept: PRIMARY CARE CLINIC | Facility: CLINIC | Age: 56
End: 2021-02-04

## 2021-03-01 ENCOUNTER — PATIENT OUTREACH (OUTPATIENT)
Dept: ADMINISTRATIVE | Facility: OTHER | Age: 56
End: 2021-03-01

## 2021-03-02 ENCOUNTER — PATIENT OUTREACH (OUTPATIENT)
Dept: ADMINISTRATIVE | Facility: HOSPITAL | Age: 56
End: 2021-03-02

## 2021-03-03 ENCOUNTER — CLINICAL SUPPORT (OUTPATIENT)
Dept: URGENT CARE | Facility: CLINIC | Age: 56
End: 2021-03-03
Payer: MEDICARE

## 2021-03-03 ENCOUNTER — OFFICE VISIT (OUTPATIENT)
Dept: OPTOMETRY | Facility: CLINIC | Age: 56
End: 2021-03-03
Payer: MEDICARE

## 2021-03-03 DIAGNOSIS — H52.4 HYPEROPIA WITH PRESBYOPIA OF RIGHT EYE: Primary | ICD-10-CM

## 2021-03-03 DIAGNOSIS — H52.01 HYPEROPIA WITH PRESBYOPIA OF RIGHT EYE: Primary | ICD-10-CM

## 2021-03-03 DIAGNOSIS — H52.202 MYOPIA WITH ASTIGMATISM AND PRESBYOPIA, LEFT: ICD-10-CM

## 2021-03-03 DIAGNOSIS — H52.4 MYOPIA WITH ASTIGMATISM AND PRESBYOPIA, LEFT: ICD-10-CM

## 2021-03-03 DIAGNOSIS — Z20.822 EXPOSURE TO COVID-19 VIRUS: Primary | ICD-10-CM

## 2021-03-03 DIAGNOSIS — H25.813 COMBINED FORM OF AGE-RELATED CATARACT, BOTH EYES: ICD-10-CM

## 2021-03-03 DIAGNOSIS — H04.123 DRY EYE SYNDROME OF BOTH EYES: ICD-10-CM

## 2021-03-03 DIAGNOSIS — H52.12 MYOPIA WITH ASTIGMATISM AND PRESBYOPIA, LEFT: ICD-10-CM

## 2021-03-03 LAB
CTP QC/QA: YES
SARS-COV-2 RDRP RESP QL NAA+PROBE: NEGATIVE

## 2021-03-03 PROCEDURE — U0002 COVID-19 LAB TEST NON-CDC: HCPCS | Mod: QW,S$GLB,, | Performed by: STUDENT IN AN ORGANIZED HEALTH CARE EDUCATION/TRAINING PROGRAM

## 2021-03-03 PROCEDURE — 99999 PR PBB SHADOW E&M-EST. PATIENT-LVL II: ICD-10-PCS | Mod: PBBFAC,,, | Performed by: OPTOMETRIST

## 2021-03-03 PROCEDURE — U0002: ICD-10-PCS | Mod: QW,S$GLB,, | Performed by: STUDENT IN AN ORGANIZED HEALTH CARE EDUCATION/TRAINING PROGRAM

## 2021-03-03 PROCEDURE — 1126F PR PAIN SEVERITY QUANTIFIED, NO PAIN PRESENT: ICD-10-PCS | Mod: S$GLB,,, | Performed by: OPTOMETRIST

## 2021-03-03 PROCEDURE — 92004 COMPRE OPH EXAM NEW PT 1/>: CPT | Mod: S$GLB,,, | Performed by: OPTOMETRIST

## 2021-03-03 PROCEDURE — 92015 PR REFRACTION: ICD-10-PCS | Mod: S$GLB,,, | Performed by: OPTOMETRIST

## 2021-03-03 PROCEDURE — 92015 DETERMINE REFRACTIVE STATE: CPT | Mod: S$GLB,,, | Performed by: OPTOMETRIST

## 2021-03-03 PROCEDURE — 92004 PR EYE EXAM, NEW PATIENT,COMPREHESV: ICD-10-PCS | Mod: S$GLB,,, | Performed by: OPTOMETRIST

## 2021-03-03 PROCEDURE — 1126F AMNT PAIN NOTED NONE PRSNT: CPT | Mod: S$GLB,,, | Performed by: OPTOMETRIST

## 2021-03-03 PROCEDURE — 99999 PR PBB SHADOW E&M-EST. PATIENT-LVL II: CPT | Mod: PBBFAC,,, | Performed by: OPTOMETRIST

## 2021-03-09 ENCOUNTER — LAB VISIT (OUTPATIENT)
Dept: LAB | Facility: HOSPITAL | Age: 56
End: 2021-03-09
Attending: FAMILY MEDICINE
Payer: MEDICARE

## 2021-03-09 DIAGNOSIS — Z00.00 LABORATORY EXAM ORDERED AS PART OF ROUTINE GENERAL MEDICAL EXAMINATION: ICD-10-CM

## 2021-03-09 DIAGNOSIS — E78.00 HYPERCHOLESTEREMIA: ICD-10-CM

## 2021-03-09 DIAGNOSIS — E66.01 CLASS 3 SEVERE OBESITY DUE TO EXCESS CALORIES WITHOUT SERIOUS COMORBIDITY WITH BODY MASS INDEX (BMI) OF 40.0 TO 44.9 IN ADULT: ICD-10-CM

## 2021-03-09 DIAGNOSIS — D50.8 IRON DEFICIENCY ANEMIA SECONDARY TO INADEQUATE DIETARY IRON INTAKE: ICD-10-CM

## 2021-03-09 DIAGNOSIS — Z86.39 PERSONAL HISTORY OF OTHER ENDOCRINE, NUTRITIONAL AND METABOLIC DISEASE: ICD-10-CM

## 2021-03-09 LAB
ALBUMIN SERPL BCP-MCNC: 3.6 G/DL (ref 3.5–5.2)
ALP SERPL-CCNC: 76 U/L (ref 55–135)
ALT SERPL W/O P-5'-P-CCNC: 13 U/L (ref 10–44)
ANION GAP SERPL CALC-SCNC: 7 MMOL/L (ref 8–16)
AST SERPL-CCNC: 17 U/L (ref 10–40)
BILIRUB SERPL-MCNC: 0.3 MG/DL (ref 0.1–1)
BUN SERPL-MCNC: 13 MG/DL (ref 6–20)
CALCIUM SERPL-MCNC: 8.8 MG/DL (ref 8.7–10.5)
CHLORIDE SERPL-SCNC: 107 MMOL/L (ref 95–110)
CHOLEST SERPL-MCNC: 220 MG/DL (ref 120–199)
CHOLEST/HDLC SERPL: 3 {RATIO} (ref 2–5)
CO2 SERPL-SCNC: 26 MMOL/L (ref 23–29)
CREAT SERPL-MCNC: 0.9 MG/DL (ref 0.5–1.4)
ERYTHROCYTE [DISTWIDTH] IN BLOOD BY AUTOMATED COUNT: 14.6 % (ref 11.5–14.5)
EST. GFR  (AFRICAN AMERICAN): >60 ML/MIN/1.73 M^2
EST. GFR  (NON AFRICAN AMERICAN): >60 ML/MIN/1.73 M^2
ESTIMATED AVG GLUCOSE: 97 MG/DL (ref 68–131)
FERRITIN SERPL-MCNC: 9 NG/ML (ref 20–300)
GLUCOSE SERPL-MCNC: 94 MG/DL (ref 70–110)
HBA1C MFR BLD: 5 % (ref 4–5.6)
HCT VFR BLD AUTO: 36.1 % (ref 37–48.5)
HDLC SERPL-MCNC: 73 MG/DL (ref 40–75)
HDLC SERPL: 33.2 % (ref 20–50)
HGB BLD-MCNC: 11.2 G/DL (ref 12–16)
IRON SERPL-MCNC: 55 UG/DL (ref 30–160)
LDLC SERPL CALC-MCNC: 131.6 MG/DL (ref 63–159)
MCH RBC QN AUTO: 29.6 PG (ref 27–31)
MCHC RBC AUTO-ENTMCNC: 31 G/DL (ref 32–36)
MCV RBC AUTO: 96 FL (ref 82–98)
NONHDLC SERPL-MCNC: 147 MG/DL
PLATELET # BLD AUTO: 275 K/UL (ref 150–350)
PMV BLD AUTO: 10.5 FL (ref 9.2–12.9)
POTASSIUM SERPL-SCNC: 4.6 MMOL/L (ref 3.5–5.1)
PROT SERPL-MCNC: 7.2 G/DL (ref 6–8.4)
RBC # BLD AUTO: 3.78 M/UL (ref 4–5.4)
SATURATED IRON: 15 % (ref 20–50)
SODIUM SERPL-SCNC: 140 MMOL/L (ref 136–145)
TOTAL IRON BINDING CAPACITY: 371 UG/DL (ref 250–450)
TRANSFERRIN SERPL-MCNC: 251 MG/DL (ref 200–375)
TRIGL SERPL-MCNC: 77 MG/DL (ref 30–150)
TSH SERPL DL<=0.005 MIU/L-ACNC: 1.02 UIU/ML (ref 0.4–4)
WBC # BLD AUTO: 4.49 K/UL (ref 3.9–12.7)

## 2021-03-09 PROCEDURE — 36415 COLL VENOUS BLD VENIPUNCTURE: CPT | Mod: PN | Performed by: FAMILY MEDICINE

## 2021-03-09 PROCEDURE — 84443 ASSAY THYROID STIM HORMONE: CPT | Performed by: FAMILY MEDICINE

## 2021-03-09 PROCEDURE — 83540 ASSAY OF IRON: CPT | Performed by: FAMILY MEDICINE

## 2021-03-09 PROCEDURE — 80061 LIPID PANEL: CPT | Performed by: FAMILY MEDICINE

## 2021-03-09 PROCEDURE — 80053 COMPREHEN METABOLIC PANEL: CPT | Performed by: FAMILY MEDICINE

## 2021-03-09 PROCEDURE — 83036 HEMOGLOBIN GLYCOSYLATED A1C: CPT | Performed by: FAMILY MEDICINE

## 2021-03-09 PROCEDURE — 82728 ASSAY OF FERRITIN: CPT | Performed by: FAMILY MEDICINE

## 2021-03-09 PROCEDURE — 85027 COMPLETE CBC AUTOMATED: CPT | Performed by: FAMILY MEDICINE

## 2021-03-10 ENCOUNTER — TELEPHONE (OUTPATIENT)
Dept: PRIMARY CARE CLINIC | Facility: CLINIC | Age: 56
End: 2021-03-10

## 2021-03-16 ENCOUNTER — OFFICE VISIT (OUTPATIENT)
Dept: PRIMARY CARE CLINIC | Facility: CLINIC | Age: 56
End: 2021-03-16
Payer: MEDICARE

## 2021-03-16 VITALS
OXYGEN SATURATION: 99 % | WEIGHT: 254.88 LBS | BODY MASS INDEX: 43.51 KG/M2 | SYSTOLIC BLOOD PRESSURE: 120 MMHG | HEART RATE: 87 BPM | DIASTOLIC BLOOD PRESSURE: 78 MMHG | HEIGHT: 64 IN | TEMPERATURE: 98 F

## 2021-03-16 DIAGNOSIS — D50.8 IRON DEFICIENCY ANEMIA SECONDARY TO INADEQUATE DIETARY IRON INTAKE: ICD-10-CM

## 2021-03-16 DIAGNOSIS — M15.9 PRIMARY OSTEOARTHRITIS INVOLVING MULTIPLE JOINTS: ICD-10-CM

## 2021-03-16 DIAGNOSIS — E66.01 CLASS 3 SEVERE OBESITY DUE TO EXCESS CALORIES WITH SERIOUS COMORBIDITY AND BODY MASS INDEX (BMI) OF 40.0 TO 44.9 IN ADULT: ICD-10-CM

## 2021-03-16 DIAGNOSIS — K59.03 DRUG-INDUCED CONSTIPATION: ICD-10-CM

## 2021-03-16 DIAGNOSIS — E78.00 HYPERCHOLESTEREMIA: ICD-10-CM

## 2021-03-16 DIAGNOSIS — Z00.00 ANNUAL PHYSICAL EXAM: Primary | ICD-10-CM

## 2021-03-16 PROCEDURE — 99999 PR PBB SHADOW E&M-EST. PATIENT-LVL IV: CPT | Mod: PBBFAC,,, | Performed by: FAMILY MEDICINE

## 2021-03-16 PROCEDURE — 99499 RISK ADDL DX/OHS AUDIT: ICD-10-PCS | Mod: S$GLB,,, | Performed by: FAMILY MEDICINE

## 2021-03-16 PROCEDURE — 1125F AMNT PAIN NOTED PAIN PRSNT: CPT | Mod: S$GLB,,, | Performed by: FAMILY MEDICINE

## 2021-03-16 PROCEDURE — 3008F BODY MASS INDEX DOCD: CPT | Mod: CPTII,S$GLB,, | Performed by: FAMILY MEDICINE

## 2021-03-16 PROCEDURE — 99214 OFFICE O/P EST MOD 30 MIN: CPT | Mod: S$GLB,,, | Performed by: FAMILY MEDICINE

## 2021-03-16 PROCEDURE — 1125F PR PAIN SEVERITY QUANTIFIED, PAIN PRESENT: ICD-10-PCS | Mod: S$GLB,,, | Performed by: FAMILY MEDICINE

## 2021-03-16 PROCEDURE — 99499 UNLISTED E&M SERVICE: CPT | Mod: S$GLB,,, | Performed by: FAMILY MEDICINE

## 2021-03-16 PROCEDURE — 3008F PR BODY MASS INDEX (BMI) DOCUMENTED: ICD-10-PCS | Mod: CPTII,S$GLB,, | Performed by: FAMILY MEDICINE

## 2021-03-16 PROCEDURE — 99999 PR PBB SHADOW E&M-EST. PATIENT-LVL IV: ICD-10-PCS | Mod: PBBFAC,,, | Performed by: FAMILY MEDICINE

## 2021-03-16 PROCEDURE — 99214 PR OFFICE/OUTPT VISIT, EST, LEVL IV, 30-39 MIN: ICD-10-PCS | Mod: S$GLB,,, | Performed by: FAMILY MEDICINE

## 2021-03-16 RX ORDER — CYCLOBENZAPRINE HCL 10 MG
10 TABLET ORAL 3 TIMES DAILY PRN
Qty: 30 TABLET | Refills: 2 | Status: SHIPPED | OUTPATIENT
Start: 2021-03-16 | End: 2023-03-29 | Stop reason: ALTCHOICE

## 2021-04-26 ENCOUNTER — PATIENT MESSAGE (OUTPATIENT)
Dept: RESEARCH | Facility: HOSPITAL | Age: 56
End: 2021-04-26

## 2021-05-13 ENCOUNTER — PATIENT OUTREACH (OUTPATIENT)
Dept: ADMINISTRATIVE | Facility: HOSPITAL | Age: 56
End: 2021-05-13

## 2022-01-12 ENCOUNTER — TELEPHONE (OUTPATIENT)
Dept: PRIMARY CARE CLINIC | Facility: CLINIC | Age: 57
End: 2022-01-12
Payer: MEDICARE

## 2022-01-12 DIAGNOSIS — Z12.31 ENCOUNTER FOR SCREENING MAMMOGRAM FOR BREAST CANCER: Primary | ICD-10-CM

## 2022-01-18 ENCOUNTER — PES CALL (OUTPATIENT)
Dept: ADMINISTRATIVE | Facility: CLINIC | Age: 57
End: 2022-01-18
Payer: MEDICARE

## 2022-01-20 ENCOUNTER — HOSPITAL ENCOUNTER (OUTPATIENT)
Dept: RADIOLOGY | Facility: HOSPITAL | Age: 57
Discharge: HOME | End: 2022-01-20
Attending: FAMILY MEDICINE
Payer: MEDICARE

## 2022-01-20 VITALS — BODY MASS INDEX: 43.36 KG/M2 | HEIGHT: 64 IN | WEIGHT: 254 LBS

## 2022-01-20 DIAGNOSIS — Z12.31 ENCOUNTER FOR SCREENING MAMMOGRAM FOR BREAST CANCER: ICD-10-CM

## 2022-01-20 PROCEDURE — 77067 SCR MAMMO BI INCL CAD: CPT | Mod: TC,PN

## 2022-01-20 PROCEDURE — 77063 MAMMO DIGITAL SCREENING BILAT WITH TOMO: ICD-10-PCS | Mod: 26,,, | Performed by: RADIOLOGY

## 2022-01-20 PROCEDURE — 77067 MAMMO DIGITAL SCREENING BILAT WITH TOMO: ICD-10-PCS | Mod: 26,,, | Performed by: RADIOLOGY

## 2022-01-20 PROCEDURE — 77063 BREAST TOMOSYNTHESIS BI: CPT | Mod: TC,PN

## 2022-01-20 PROCEDURE — 77067 SCR MAMMO BI INCL CAD: CPT | Mod: 26,,, | Performed by: RADIOLOGY

## 2022-01-20 PROCEDURE — 77063 BREAST TOMOSYNTHESIS BI: CPT | Mod: 26,,, | Performed by: RADIOLOGY

## 2022-03-15 ENCOUNTER — OFFICE VISIT (OUTPATIENT)
Dept: HOME HEALTH SERVICES | Facility: CLINIC | Age: 57
End: 2022-03-15
Payer: MEDICARE

## 2022-03-15 VITALS
HEIGHT: 64 IN | HEART RATE: 72 BPM | TEMPERATURE: 97 F | BODY MASS INDEX: 43.54 KG/M2 | DIASTOLIC BLOOD PRESSURE: 77 MMHG | SYSTOLIC BLOOD PRESSURE: 130 MMHG | WEIGHT: 255 LBS

## 2022-03-15 DIAGNOSIS — D50.8 IRON DEFICIENCY ANEMIA SECONDARY TO INADEQUATE DIETARY IRON INTAKE: ICD-10-CM

## 2022-03-15 DIAGNOSIS — Z00.00 ENCOUNTER FOR PREVENTIVE HEALTH EXAMINATION: Primary | ICD-10-CM

## 2022-03-15 DIAGNOSIS — E66.01 MORBID OBESITY WITH BMI OF 40.0-44.9, ADULT: ICD-10-CM

## 2022-03-15 DIAGNOSIS — E78.00 HYPERCHOLESTEREMIA: ICD-10-CM

## 2022-03-15 PROCEDURE — 99499 UNLISTED E&M SERVICE: CPT | Mod: S$GLB,,, | Performed by: NURSE PRACTITIONER

## 2022-03-15 PROCEDURE — 1159F MED LIST DOCD IN RCRD: CPT | Mod: CPTII,S$GLB,, | Performed by: NURSE PRACTITIONER

## 2022-03-15 PROCEDURE — 3075F PR MOST RECENT SYSTOLIC BLOOD PRESS GE 130-139MM HG: ICD-10-PCS | Mod: CPTII,S$GLB,, | Performed by: NURSE PRACTITIONER

## 2022-03-15 PROCEDURE — G0439 PR MEDICARE ANNUAL WELLNESS SUBSEQUENT VISIT: ICD-10-PCS | Mod: S$GLB,,, | Performed by: NURSE PRACTITIONER

## 2022-03-15 PROCEDURE — 99499 RISK ADDL DX/OHS AUDIT: ICD-10-PCS | Mod: S$GLB,,, | Performed by: NURSE PRACTITIONER

## 2022-03-15 PROCEDURE — 3075F SYST BP GE 130 - 139MM HG: CPT | Mod: CPTII,S$GLB,, | Performed by: NURSE PRACTITIONER

## 2022-03-15 PROCEDURE — 3078F PR MOST RECENT DIASTOLIC BLOOD PRESSURE < 80 MM HG: ICD-10-PCS | Mod: CPTII,S$GLB,, | Performed by: NURSE PRACTITIONER

## 2022-03-15 PROCEDURE — G0439 PPPS, SUBSEQ VISIT: HCPCS | Mod: S$GLB,,, | Performed by: NURSE PRACTITIONER

## 2022-03-15 PROCEDURE — 1159F PR MEDICATION LIST DOCUMENTED IN MEDICAL RECORD: ICD-10-PCS | Mod: CPTII,S$GLB,, | Performed by: NURSE PRACTITIONER

## 2022-03-15 PROCEDURE — 1160F PR REVIEW ALL MEDS BY PRESCRIBER/CLIN PHARMACIST DOCUMENTED: ICD-10-PCS | Mod: CPTII,S$GLB,, | Performed by: NURSE PRACTITIONER

## 2022-03-15 PROCEDURE — 1160F RVW MEDS BY RX/DR IN RCRD: CPT | Mod: CPTII,S$GLB,, | Performed by: NURSE PRACTITIONER

## 2022-03-15 PROCEDURE — 3008F PR BODY MASS INDEX (BMI) DOCUMENTED: ICD-10-PCS | Mod: CPTII,S$GLB,, | Performed by: NURSE PRACTITIONER

## 2022-03-15 PROCEDURE — 3078F DIAST BP <80 MM HG: CPT | Mod: CPTII,S$GLB,, | Performed by: NURSE PRACTITIONER

## 2022-03-15 PROCEDURE — 3008F BODY MASS INDEX DOCD: CPT | Mod: CPTII,S$GLB,, | Performed by: NURSE PRACTITIONER

## 2022-03-15 NOTE — PATIENT INSTRUCTIONS
Counseling and Referral of Other Preventative  (Italic type indicates deductible and co-insurance are waived)    Patient Name: Martha Zarate  Today's Date: 3/15/2022    Health Maintenance       Date Due Completion Date    COVID-19 Vaccine (3 - Booster for Moderna series) 09/23/2021 4/23/2021    Colorectal Cancer Screening 02/01/2022 2/1/2021    Mammogram 01/20/2023 1/20/2022    TETANUS VACCINE 01/01/2025 1/1/2015    Cervical Cancer Screening 12/08/2025 12/8/2020    Lipid Panel 03/09/2026 3/9/2021        No orders of the defined types were placed in this encounter.    The following information is provided to all patients.  This information is to help you find resources for any of the problems found today that may be affecting your health:                Living healthy guide: www.[facility].[place].gov      Understanding Diabetes: www.diabetes.org      Eating healthy: www.cdc.gov/healthyweight      CDC home safety checklist: www.cdc.gov/steadi/patient.html      Agency on Aging: www.goea.[place].gov      Alcoholics anonymous (AA): www.aa.org      Physical Activity: www.yudy.nih.gov/xf8zljk      Tobacco use: www.quitwithusla.org

## 2022-03-15 NOTE — PROGRESS NOTES
"  Martha Zarate presented for a  Medicare AWV and comprehensive Health Risk Assessment today. The following components were reviewed and updated:    · Medical history  · Family History  · Social history  · Allergies and Current Medications  · Health Risk Assessment  · Health Maintenance  · Care Team         ** See Completed Assessments for Annual Wellness Visit within the encounter summary.**         The following assessments were completed:  · Living Situation  · CAGE  · Depression Screening  · Timed Get Up and Go  · Whisper Test  · Cognitive Function Screening  ·   ·   ·   · Nutrition Screening  · ADL Screening  · PAQ Screening        Vitals:    03/15/22 1204   BP: 130/77   Pulse: 72   Temp: 96.9 °F (36.1 °C)   Weight: 115.7 kg (255 lb)   Height: 5' 4" (1.626 m)     Body mass index is 43.77 kg/m².  Physical Exam  Constitutional:       Appearance: She is obese.   HENT:      Head: Normocephalic and atraumatic.      Nose: Nose normal.      Mouth/Throat:      Mouth: Mucous membranes are moist.   Eyes:      Extraocular Movements: Extraocular movements intact.   Cardiovascular:      Rate and Rhythm: Normal rate and regular rhythm.      Heart sounds: Normal heart sounds.   Pulmonary:      Effort: Pulmonary effort is normal. No respiratory distress.      Breath sounds: Normal breath sounds.   Abdominal:      General: Bowel sounds are normal. There is no distension.      Palpations: Abdomen is soft.   Musculoskeletal:         General: No swelling. Normal range of motion.      Cervical back: Normal range of motion.   Skin:     General: Skin is warm and dry.   Neurological:      General: No focal deficit present.      Mental Status: She is alert and oriented to person, place, and time.   Psychiatric:         Mood and Affect: Mood normal.         Behavior: Behavior normal.               Diagnoses and health risks identified today and associated recommendations/orders:    1. Encounter for preventive health " examination  Assessments completed. Preventive measures and health maintenance reviewed with patient.    2. Morbid obesity with BMI of 40.0-44.9, adult  Stable, followed by PCP. Diet and exercise encouraged.    3. Hypercholesteremia  Stable, followed by PCP.    4. Iron deficiency anemia secondary to inadequate dietary iron intake  Stable, followed by PCP.    Provided Martha with a 5-10 year written screening schedule and personal prevention plan. Recommendations were developed using the USPSTF age appropriate recommendations. Education, counseling, and referrals were provided as needed. After Visit Summary printed and given to patient which includes a list of additional screenings\tests needed.    Follow up in about 1 year (around 3/15/2023) for your next annual wellness visit.    Jeaneth Meade, TODD  I offered to discuss advanced care planning, including how to pick a person who would make decisions for you if you were unable to make them for yourself, called a health care power of , and what kind of decisions you might make such as use of life sustaining treatments such as ventilators and tube feeding when faced with a life limiting illness recorded on a living will that they will need to know. (How you want to be cared for as you near the end of your natural life)     X Patient is interested in learning more about how to make advanced directives.  I provided them paperwork and offered to discuss this with them.

## 2022-03-28 ENCOUNTER — OFFICE VISIT (OUTPATIENT)
Dept: PRIMARY CARE CLINIC | Facility: CLINIC | Age: 57
End: 2022-03-28
Payer: MEDICARE

## 2022-03-28 VITALS
HEIGHT: 64 IN | TEMPERATURE: 98 F | WEIGHT: 260.56 LBS | HEART RATE: 92 BPM | OXYGEN SATURATION: 98 % | BODY MASS INDEX: 44.48 KG/M2 | DIASTOLIC BLOOD PRESSURE: 74 MMHG | SYSTOLIC BLOOD PRESSURE: 120 MMHG

## 2022-03-28 DIAGNOSIS — Z00.00 ANNUAL PHYSICAL EXAM: Primary | ICD-10-CM

## 2022-03-28 DIAGNOSIS — E78.00 HYPERCHOLESTEREMIA: ICD-10-CM

## 2022-03-28 DIAGNOSIS — Z86.39 PERSONAL HISTORY OF OTHER ENDOCRINE, NUTRITIONAL AND METABOLIC DISEASE: ICD-10-CM

## 2022-03-28 DIAGNOSIS — E66.01 CLASS 3 SEVERE OBESITY DUE TO EXCESS CALORIES WITH SERIOUS COMORBIDITY AND BODY MASS INDEX (BMI) OF 40.0 TO 44.9 IN ADULT: ICD-10-CM

## 2022-03-28 DIAGNOSIS — D50.8 IRON DEFICIENCY ANEMIA SECONDARY TO INADEQUATE DIETARY IRON INTAKE: ICD-10-CM

## 2022-03-28 DIAGNOSIS — Z12.11 SCREENING FOR MALIGNANT NEOPLASM OF COLON: ICD-10-CM

## 2022-03-28 DIAGNOSIS — Z00.00 LABORATORY EXAM ORDERED AS PART OF ROUTINE GENERAL MEDICAL EXAMINATION: ICD-10-CM

## 2022-03-28 DIAGNOSIS — M15.9 PRIMARY OSTEOARTHRITIS INVOLVING MULTIPLE JOINTS: ICD-10-CM

## 2022-03-28 DIAGNOSIS — Z13.6 ENCOUNTER FOR LIPID SCREENING FOR CARDIOVASCULAR DISEASE: ICD-10-CM

## 2022-03-28 DIAGNOSIS — Z13.220 ENCOUNTER FOR LIPID SCREENING FOR CARDIOVASCULAR DISEASE: ICD-10-CM

## 2022-03-28 PROBLEM — M15.0 PRIMARY OSTEOARTHRITIS INVOLVING MULTIPLE JOINTS: Status: ACTIVE | Noted: 2022-03-28

## 2022-03-28 PROCEDURE — 99999 PR PBB SHADOW E&M-EST. PATIENT-LVL III: CPT | Mod: PBBFAC,,, | Performed by: FAMILY MEDICINE

## 2022-03-28 PROCEDURE — 99999 PR PBB SHADOW E&M-EST. PATIENT-LVL III: ICD-10-PCS | Mod: PBBFAC,,, | Performed by: FAMILY MEDICINE

## 2022-03-28 PROCEDURE — 99214 PR OFFICE/OUTPT VISIT, EST, LEVL IV, 30-39 MIN: ICD-10-PCS | Mod: S$GLB,,, | Performed by: FAMILY MEDICINE

## 2022-03-28 PROCEDURE — 1159F PR MEDICATION LIST DOCUMENTED IN MEDICAL RECORD: ICD-10-PCS | Mod: CPTII,S$GLB,, | Performed by: FAMILY MEDICINE

## 2022-03-28 PROCEDURE — 3078F PR MOST RECENT DIASTOLIC BLOOD PRESSURE < 80 MM HG: ICD-10-PCS | Mod: CPTII,S$GLB,, | Performed by: FAMILY MEDICINE

## 2022-03-28 PROCEDURE — 1160F RVW MEDS BY RX/DR IN RCRD: CPT | Mod: CPTII,S$GLB,, | Performed by: FAMILY MEDICINE

## 2022-03-28 PROCEDURE — 3008F PR BODY MASS INDEX (BMI) DOCUMENTED: ICD-10-PCS | Mod: CPTII,S$GLB,, | Performed by: FAMILY MEDICINE

## 2022-03-28 PROCEDURE — 99214 OFFICE O/P EST MOD 30 MIN: CPT | Mod: S$GLB,,, | Performed by: FAMILY MEDICINE

## 2022-03-28 PROCEDURE — 1160F PR REVIEW ALL MEDS BY PRESCRIBER/CLIN PHARMACIST DOCUMENTED: ICD-10-PCS | Mod: CPTII,S$GLB,, | Performed by: FAMILY MEDICINE

## 2022-03-28 PROCEDURE — 3078F DIAST BP <80 MM HG: CPT | Mod: CPTII,S$GLB,, | Performed by: FAMILY MEDICINE

## 2022-03-28 PROCEDURE — 1159F MED LIST DOCD IN RCRD: CPT | Mod: CPTII,S$GLB,, | Performed by: FAMILY MEDICINE

## 2022-03-28 PROCEDURE — 3008F BODY MASS INDEX DOCD: CPT | Mod: CPTII,S$GLB,, | Performed by: FAMILY MEDICINE

## 2022-03-28 PROCEDURE — 3074F SYST BP LT 130 MM HG: CPT | Mod: CPTII,S$GLB,, | Performed by: FAMILY MEDICINE

## 2022-03-28 PROCEDURE — 3074F PR MOST RECENT SYSTOLIC BLOOD PRESSURE < 130 MM HG: ICD-10-PCS | Mod: CPTII,S$GLB,, | Performed by: FAMILY MEDICINE

## 2022-03-28 NOTE — PROGRESS NOTES
Subjective:       Patient ID: Marhta Zarate is a 56 y.o. female.    Chief Complaint: Annual Exam      55 yo female presents for annual physical exam.    She has history of self reported arthritis- hands, elbows, knees; chronic lower back pain. PMH iron deficiency anemia and takes a multivitamin with iron; obesity, hypercholesterolemia.    No swelling, no numbness, no weakness or paralysis. No trauma.    Lipid disorders/ASCVD risk (ages >/= 45 or >/= 20 if increased risk ): Ordered  DM (>45y yearly or if obese, HTN): Ordered  Breast Cancer (40-50y discretion of pt, 50-74y every 1-2 years): Up to date  Cervical Cancer (Pap Smear ages 21-65 every 3 years or Pap + HPV q5 years after 30 years of age): Up to date      The following portions of the patient's history were reviewed and updated as appropriate: allergies, current medications, past family history, past medical history, past social history, past surgical history and problem list.    Review of Systems   Constitutional: Negative for activity change, appetite change, chills, diaphoresis, fatigue, fever and unexpected weight change.   HENT: Negative for nasal congestion, dental problem, facial swelling, nosebleeds, postnasal drip, rhinorrhea, sore throat, tinnitus and trouble swallowing.    Eyes: Negative for pain, discharge, itching and visual disturbance.   Respiratory: Negative for apnea, chest tightness, shortness of breath, wheezing and stridor.    Cardiovascular: Negative for chest pain, palpitations and leg swelling.   Gastrointestinal: Negative for abdominal distention, abdominal pain, constipation, diarrhea, nausea, rectal pain and vomiting.   Endocrine: Negative for cold intolerance, heat intolerance and polyuria.   Genitourinary: Negative for difficulty urinating, dysuria, frequency, hematuria and urgency.   Musculoskeletal: Positive for arthralgia.   Integumentary:  Negative for color change and rash.   Neurological: Negative for dizziness, tremors,  "seizures, syncope, facial asymmetry, weakness and headaches.   Hematological: Negative for adenopathy. Does not bruise/bleed easily.   Psychiatric/Behavioral: Negative for agitation, confusion, hallucinations, self-injury and suicidal ideas. The patient is not hyperactive.           Objective:       Vitals:    03/28/22 1019   BP: 120/74   Pulse: 92   Temp: 98.3 °F (36.8 °C)   TempSrc: Oral   SpO2: 98%   Weight: 118.2 kg (260 lb 9.3 oz)   Height: 5' 4" (1.626 m)     Physical Exam  Constitutional:       General: She is not in acute distress.     Appearance: She is well-developed. She is obese. She is not diaphoretic.   HENT:      Head: Normocephalic and atraumatic.      Right Ear: External ear normal.      Left Ear: External ear normal.   Eyes:      General: No scleral icterus.        Right eye: No discharge.         Left eye: No discharge.      Conjunctiva/sclera: Conjunctivae normal.      Pupils: Pupils are equal, round, and reactive to light.   Neck:      Musculoskeletal: Normal range of motion and neck supple.      Thyroid: No thyromegaly.   Cardiovascular:      Rate and Rhythm: Normal rate and regular rhythm.      Heart sounds: Normal heart sounds. No murmur. No friction rub. No gallop.    Pulmonary:      Effort: Pulmonary effort is normal. No respiratory distress.      Breath sounds: Normal breath sounds.   Chest:      Chest wall: No tenderness.   Abdominal:      General: Bowel sounds are normal. There is no distension.      Palpations: Abdomen is soft. There is no mass.      Tenderness: There is no abdominal tenderness. There is no guarding or rebound.   Musculoskeletal: Normal range of motion.      Comments: No swelling or deformity of upper or lower extremities, crepitus in bilateral knee joints.   Lymphadenopathy:      Cervical: No cervical adenopathy.   Skin:     General: Skin is warm.      Capillary Refill: Capillary refill takes less than 2 seconds.      Findings: No rash.   Neurological:      Mental " Status: She is alert and oriented to person, place, and time.      Cranial Nerves: No cranial nerve deficit.      Motor: No abnormal muscle tone.      Coordination: Coordination normal.      Deep Tendon Reflexes: Reflexes normal.   Psychiatric:         Thought Content: Thought content normal.         Judgment: Judgment normal.         Assessment:       1. Annual physical exam    2. Class 3 severe obesity due to excess calories with serious comorbidity and body mass index (BMI) of 40.0 to 44.9 in adult    3. Primary osteoarthritis involving multiple joints    4. Hypercholesteremia    5. Iron deficiency anemia secondary to inadequate dietary iron intake    6. Personal history of other endocrine, nutritional and metabolic disease    7. Screening for malignant neoplasm of colon    8. Laboratory exam ordered as part of routine general medical examination    9. Encounter for lipid screening for cardiovascular disease        Plan:       1. Annual physical exam  Age appropriate prevention guidelines implemented, unless patient refused  Encourage Advanced directives  Labs ordered   Immunizations reviewed. Encourage yearly influenza vaccine.  Patient Counseling:  --Nutrition: Encourage healthy food choices, adequate water intake, moderate sodium/caffeine intake, diet low in saturated fat and cholesterol. Importance of caloric balance and sufficient intake of fresh fruits, vegetables, fiber, calcium, iron, and 1 mg of folate supplement per day (for females capable of pregnancy).  --Exercise: Stressed the importance of regular exercise.   --Substance Abuse: Abstinence from tobacco products. No more than 2 alcoholic drinks per day in men or 1 alcoholic drink per day in women. Avoid illicit drugs, driving or other dangerous activities under the influence. In the event of abuse, treatment offered.   --Sexuality: Safe sex practices to avoid sexually transmitted diseases; careful partner selection, use of condoms and contraceptive  alternatives, avoidance of unintended pregnancy in fertile women of child-bearing age  --Injury prevention: encourage safety belts, safety helmets, smoke detector.  --Dental health: Discussed importance of regular tooth brushing X2 daily, flossing X1 daily, and routine dental visits.  --Encourage use of sun screen, wear sun protective clothing  --Encourage routine eye exams    2. Class 3 severe obesity due to excess calories with serious comorbidity and body mass index (BMI) of 40.0 to 44.9 in adult  -     CBC Auto Differential; Future  -     Comprehensive Metabolic Panel; Future  Surgery -- Candidates for bariatric surgery include adults with a BMI ?40 kg/m2, or a BMI of 35 to 39.9 kg/m2 with at least one serious comorbidity, who have not met weight loss goals with diet, exercise, and drug therapy. There is some evidence that the BMI criteria may differ among races.  Candidates for pharmacologic therapy include adults with a body mass index (BMI) greater than 30 kg/m2, or a BMI of 27 to 29.9 kg/m2 with comorbidities, who have not met weight loss goals (loss of at least 5 percent of total body weight at three to six months) with a comprehensive lifestyle intervention.      The importance of optimum diet & exercise discussed. The goal for weight management is 5-10 % of total body weight reduction in 3 months.     The consumption of a reduced calorie diet, frequent self-weighing, and participation in a lifestyle intervention program are strategies to help maintain weight loss. Bariatric surgery, which may alter the body's adipose tissue set point, and extended use of anti-obesity pharmacologic therapy may help address these underlying physiologic changes.     3. Primary osteoarthritis involving multiple joints  Weight loss. Manages conservatively.    4. Hypercholesteremia  Low cholesterol foods, exercise and weight loss encouraged    5. Iron deficiency anemia secondary to inadequate dietary iron intake  -     Iron and  TIBC; Future  -     Ferritin; Future  She takes a multivitamin with iron    6. Personal history of other endocrine, nutritional and metabolic disease  -     Hemoglobin A1C; Future  -     TSH; Future    7. Screening for malignant neoplasm of colon  -     Fecal Immunochemical Test (iFOBT); Future  She will not do colonoscopy but wants to repeat FIT kit; if FIT abnormal will need to proceed with colonoscopy    8. Laboratory exam ordered as part of routine general medical examination  -     CBC Auto Differential; Future  -     Comprehensive Metabolic Panel; Future  -     Iron and TIBC; Future  -     Ferritin; Future    9. Encounter for lipid screening for cardiovascular disease  Lipid panel      Disclaimer: This note has been generated using voice-recognition software. There may be typographical errors that have been missed during proof-reading

## 2022-03-31 ENCOUNTER — LAB VISIT (OUTPATIENT)
Dept: LAB | Facility: HOSPITAL | Age: 57
End: 2022-03-31
Attending: FAMILY MEDICINE
Payer: MEDICARE

## 2022-03-31 DIAGNOSIS — Z13.220 ENCOUNTER FOR LIPID SCREENING FOR CARDIOVASCULAR DISEASE: ICD-10-CM

## 2022-03-31 DIAGNOSIS — Z00.00 LABORATORY EXAM ORDERED AS PART OF ROUTINE GENERAL MEDICAL EXAMINATION: ICD-10-CM

## 2022-03-31 DIAGNOSIS — Z86.39 PERSONAL HISTORY OF OTHER ENDOCRINE, NUTRITIONAL AND METABOLIC DISEASE: ICD-10-CM

## 2022-03-31 DIAGNOSIS — Z13.6 ENCOUNTER FOR LIPID SCREENING FOR CARDIOVASCULAR DISEASE: ICD-10-CM

## 2022-03-31 DIAGNOSIS — E66.01 CLASS 3 SEVERE OBESITY DUE TO EXCESS CALORIES WITH SERIOUS COMORBIDITY AND BODY MASS INDEX (BMI) OF 40.0 TO 44.9 IN ADULT: ICD-10-CM

## 2022-03-31 DIAGNOSIS — D50.8 IRON DEFICIENCY ANEMIA SECONDARY TO INADEQUATE DIETARY IRON INTAKE: ICD-10-CM

## 2022-03-31 LAB
ALBUMIN SERPL BCP-MCNC: 3.9 G/DL (ref 3.5–5.2)
ALP SERPL-CCNC: 85 U/L (ref 55–135)
ALT SERPL W/O P-5'-P-CCNC: 14 U/L (ref 10–44)
ANION GAP SERPL CALC-SCNC: 9 MMOL/L (ref 8–16)
AST SERPL-CCNC: 17 U/L (ref 10–40)
BASOPHILS # BLD AUTO: 0.05 K/UL (ref 0–0.2)
BASOPHILS NFR BLD: 1 % (ref 0–1.9)
BILIRUB SERPL-MCNC: 0.3 MG/DL (ref 0.1–1)
BUN SERPL-MCNC: 14 MG/DL (ref 6–20)
CALCIUM SERPL-MCNC: 9.2 MG/DL (ref 8.7–10.5)
CHLORIDE SERPL-SCNC: 103 MMOL/L (ref 95–110)
CHOLEST SERPL-MCNC: 240 MG/DL (ref 120–199)
CHOLEST/HDLC SERPL: 3.2 {RATIO} (ref 2–5)
CO2 SERPL-SCNC: 27 MMOL/L (ref 23–29)
CREAT SERPL-MCNC: 0.9 MG/DL (ref 0.5–1.4)
DIFFERENTIAL METHOD: ABNORMAL
EOSINOPHIL # BLD AUTO: 0.3 K/UL (ref 0–0.5)
EOSINOPHIL NFR BLD: 6.1 % (ref 0–8)
ERYTHROCYTE [DISTWIDTH] IN BLOOD BY AUTOMATED COUNT: 13.2 % (ref 11.5–14.5)
EST. GFR  (AFRICAN AMERICAN): >60 ML/MIN/1.73 M^2
EST. GFR  (NON AFRICAN AMERICAN): >60 ML/MIN/1.73 M^2
ESTIMATED AVG GLUCOSE: 97 MG/DL (ref 68–131)
FERRITIN SERPL-MCNC: 23 NG/ML (ref 20–300)
GLUCOSE SERPL-MCNC: 88 MG/DL (ref 70–110)
HBA1C MFR BLD: 5 % (ref 4–5.6)
HCT VFR BLD AUTO: 37.8 % (ref 37–48.5)
HDLC SERPL-MCNC: 76 MG/DL (ref 40–75)
HDLC SERPL: 31.7 % (ref 20–50)
HGB BLD-MCNC: 11.9 G/DL (ref 12–16)
IMM GRANULOCYTES # BLD AUTO: 0.01 K/UL (ref 0–0.04)
IMM GRANULOCYTES NFR BLD AUTO: 0.2 % (ref 0–0.5)
IRON SERPL-MCNC: 74 UG/DL (ref 30–160)
LDLC SERPL CALC-MCNC: 149.4 MG/DL (ref 63–159)
LYMPHOCYTES # BLD AUTO: 1.9 K/UL (ref 1–4.8)
LYMPHOCYTES NFR BLD: 38.3 % (ref 18–48)
MCH RBC QN AUTO: 29.5 PG (ref 27–31)
MCHC RBC AUTO-ENTMCNC: 31.5 G/DL (ref 32–36)
MCV RBC AUTO: 94 FL (ref 82–98)
MONOCYTES # BLD AUTO: 0.5 K/UL (ref 0.3–1)
MONOCYTES NFR BLD: 9.9 % (ref 4–15)
NEUTROPHILS # BLD AUTO: 2.3 K/UL (ref 1.8–7.7)
NEUTROPHILS NFR BLD: 44.5 % (ref 38–73)
NONHDLC SERPL-MCNC: 164 MG/DL
NRBC BLD-RTO: 0 /100 WBC
PLATELET # BLD AUTO: 323 K/UL (ref 150–450)
PMV BLD AUTO: 9.7 FL (ref 9.2–12.9)
POTASSIUM SERPL-SCNC: 4.1 MMOL/L (ref 3.5–5.1)
PROT SERPL-MCNC: 7.8 G/DL (ref 6–8.4)
RBC # BLD AUTO: 4.04 M/UL (ref 4–5.4)
SATURATED IRON: 20 % (ref 20–50)
SODIUM SERPL-SCNC: 139 MMOL/L (ref 136–145)
TOTAL IRON BINDING CAPACITY: 376 UG/DL (ref 250–450)
TRANSFERRIN SERPL-MCNC: 254 MG/DL (ref 200–375)
TRIGL SERPL-MCNC: 73 MG/DL (ref 30–150)
TSH SERPL DL<=0.005 MIU/L-ACNC: 0.77 UIU/ML (ref 0.4–4)
WBC # BLD AUTO: 5.07 K/UL (ref 3.9–12.7)

## 2022-03-31 PROCEDURE — 80061 LIPID PANEL: CPT | Performed by: FAMILY MEDICINE

## 2022-03-31 PROCEDURE — 36415 COLL VENOUS BLD VENIPUNCTURE: CPT | Mod: PN | Performed by: FAMILY MEDICINE

## 2022-03-31 PROCEDURE — 84443 ASSAY THYROID STIM HORMONE: CPT | Performed by: FAMILY MEDICINE

## 2022-03-31 PROCEDURE — 83036 HEMOGLOBIN GLYCOSYLATED A1C: CPT | Performed by: FAMILY MEDICINE

## 2022-03-31 PROCEDURE — 85025 COMPLETE CBC W/AUTO DIFF WBC: CPT | Performed by: FAMILY MEDICINE

## 2022-03-31 PROCEDURE — 84466 ASSAY OF TRANSFERRIN: CPT | Performed by: FAMILY MEDICINE

## 2022-03-31 PROCEDURE — 82728 ASSAY OF FERRITIN: CPT | Performed by: FAMILY MEDICINE

## 2022-03-31 PROCEDURE — 80053 COMPREHEN METABOLIC PANEL: CPT | Performed by: FAMILY MEDICINE

## 2022-04-04 ENCOUNTER — TELEPHONE (OUTPATIENT)
Dept: PRIMARY CARE CLINIC | Facility: CLINIC | Age: 57
End: 2022-04-04
Payer: MEDICARE

## 2022-04-04 NOTE — TELEPHONE ENCOUNTER
----- Message from Gertrude Kaplan MD sent at 4/3/2022  8:42 PM CDT -----  Please let patient know message sent to portal    Hello,    No diabetes    Normal thyroid function test    Iron deficiency anemia has improved    Normal liver and kidney function test    Elevated total cholesterol. The good cholesterol, HDL is also high. No cholesterol medications indicated. Your calculated risk of heart disease and stroke is low. Low cholesterol foods, exercise and healthy weight loss goal are recommended.

## 2022-04-05 ENCOUNTER — TELEPHONE (OUTPATIENT)
Dept: PRIMARY CARE CLINIC | Facility: CLINIC | Age: 57
End: 2022-04-05
Payer: MEDICARE

## 2022-04-05 NOTE — TELEPHONE ENCOUNTER
----- Message from Gertrude Kaplan MD sent at 4/5/2022  1:49 PM CDT -----  Please let patient know results sent to portal    Hello!    Your stool testing is NORMAL    Repeat in ONE year    Take care.

## 2023-01-19 ENCOUNTER — TELEPHONE (OUTPATIENT)
Dept: PRIMARY CARE CLINIC | Facility: CLINIC | Age: 58
End: 2023-01-19
Payer: MEDICARE

## 2023-01-19 DIAGNOSIS — Z86.39 PERSONAL HISTORY OF OTHER ENDOCRINE, NUTRITIONAL AND METABOLIC DISEASE: Primary | ICD-10-CM

## 2023-01-19 DIAGNOSIS — E66.01 CLASS 3 SEVERE OBESITY DUE TO EXCESS CALORIES WITH SERIOUS COMORBIDITY AND BODY MASS INDEX (BMI) OF 40.0 TO 44.9 IN ADULT: Primary | ICD-10-CM

## 2023-01-19 DIAGNOSIS — Z12.31 VISIT FOR SCREENING MAMMOGRAM: ICD-10-CM

## 2023-01-19 DIAGNOSIS — E78.00 HYPERCHOLESTEREMIA: ICD-10-CM

## 2023-01-25 ENCOUNTER — PATIENT MESSAGE (OUTPATIENT)
Dept: ADMINISTRATIVE | Facility: HOSPITAL | Age: 58
End: 2023-01-25
Payer: MEDICARE

## 2023-03-29 ENCOUNTER — LAB VISIT (OUTPATIENT)
Dept: LAB | Facility: HOSPITAL | Age: 58
End: 2023-03-29
Attending: STUDENT IN AN ORGANIZED HEALTH CARE EDUCATION/TRAINING PROGRAM
Payer: MEDICARE

## 2023-03-29 ENCOUNTER — OFFICE VISIT (OUTPATIENT)
Dept: PRIMARY CARE CLINIC | Facility: CLINIC | Age: 58
End: 2023-03-29
Payer: MEDICARE

## 2023-03-29 VITALS
TEMPERATURE: 98 F | WEIGHT: 265.88 LBS | HEIGHT: 64 IN | BODY MASS INDEX: 45.39 KG/M2 | OXYGEN SATURATION: 99 % | HEART RATE: 70 BPM | SYSTOLIC BLOOD PRESSURE: 120 MMHG | DIASTOLIC BLOOD PRESSURE: 76 MMHG

## 2023-03-29 DIAGNOSIS — R79.9 ABNORMAL FINDING OF BLOOD CHEMISTRY, UNSPECIFIED: ICD-10-CM

## 2023-03-29 DIAGNOSIS — M15.9 PRIMARY OSTEOARTHRITIS INVOLVING MULTIPLE JOINTS: ICD-10-CM

## 2023-03-29 DIAGNOSIS — D50.8 IRON DEFICIENCY ANEMIA SECONDARY TO INADEQUATE DIETARY IRON INTAKE: ICD-10-CM

## 2023-03-29 DIAGNOSIS — N95.1 PERIMENOPAUSE: ICD-10-CM

## 2023-03-29 DIAGNOSIS — Z76.89 ENCOUNTER TO ESTABLISH CARE WITH NEW DOCTOR: ICD-10-CM

## 2023-03-29 DIAGNOSIS — Z13.220 SCREENING FOR HYPERLIPIDEMIA: Primary | ICD-10-CM

## 2023-03-29 DIAGNOSIS — E66.01 CLASS 3 SEVERE OBESITY DUE TO EXCESS CALORIES WITH SERIOUS COMORBIDITY AND BODY MASS INDEX (BMI) OF 40.0 TO 44.9 IN ADULT: ICD-10-CM

## 2023-03-29 DIAGNOSIS — Z13.1 SCREENING FOR DIABETES MELLITUS: ICD-10-CM

## 2023-03-29 DIAGNOSIS — Z12.31 SCREENING MAMMOGRAM FOR BREAST CANCER: ICD-10-CM

## 2023-03-29 DIAGNOSIS — Z12.11 SCREENING FOR COLON CANCER: ICD-10-CM

## 2023-03-29 DIAGNOSIS — Z00.00 ENCOUNTER FOR PREVENTATIVE ADULT HEALTH CARE EXAMINATION: ICD-10-CM

## 2023-03-29 DIAGNOSIS — Z13.220 SCREENING FOR HYPERLIPIDEMIA: ICD-10-CM

## 2023-03-29 LAB
CHOLEST SERPL-MCNC: 251 MG/DL (ref 120–199)
CHOLEST/HDLC SERPL: 3.3 {RATIO} (ref 2–5)
ERYTHROCYTE [DISTWIDTH] IN BLOOD BY AUTOMATED COUNT: 13 % (ref 11.5–14.5)
ESTIMATED AVG GLUCOSE: 108 MG/DL (ref 68–131)
HBA1C MFR BLD: 5.4 % (ref 4–5.6)
HCT VFR BLD AUTO: 37.3 % (ref 37–48.5)
HDLC SERPL-MCNC: 75 MG/DL (ref 40–75)
HDLC SERPL: 29.9 % (ref 20–50)
HGB BLD-MCNC: 11.5 G/DL (ref 12–16)
LDLC SERPL CALC-MCNC: 158.6 MG/DL (ref 63–159)
MCH RBC QN AUTO: 29 PG (ref 27–31)
MCHC RBC AUTO-ENTMCNC: 30.8 G/DL (ref 32–36)
MCV RBC AUTO: 94 FL (ref 82–98)
NONHDLC SERPL-MCNC: 176 MG/DL
PLATELET # BLD AUTO: 301 K/UL (ref 150–450)
PMV BLD AUTO: 9.8 FL (ref 9.2–12.9)
RBC # BLD AUTO: 3.96 M/UL (ref 4–5.4)
TRIGL SERPL-MCNC: 87 MG/DL (ref 30–150)
WBC # BLD AUTO: 4.43 K/UL (ref 3.9–12.7)

## 2023-03-29 PROCEDURE — 85027 COMPLETE CBC AUTOMATED: CPT | Performed by: STUDENT IN AN ORGANIZED HEALTH CARE EDUCATION/TRAINING PROGRAM

## 2023-03-29 PROCEDURE — 3074F PR MOST RECENT SYSTOLIC BLOOD PRESSURE < 130 MM HG: ICD-10-PCS | Mod: CPTII,S$GLB,, | Performed by: STUDENT IN AN ORGANIZED HEALTH CARE EDUCATION/TRAINING PROGRAM

## 2023-03-29 PROCEDURE — 99999 PR PBB SHADOW E&M-EST. PATIENT-LVL V: CPT | Mod: PBBFAC,,, | Performed by: STUDENT IN AN ORGANIZED HEALTH CARE EDUCATION/TRAINING PROGRAM

## 2023-03-29 PROCEDURE — 99396 PR PREVENTIVE VISIT,EST,40-64: ICD-10-PCS | Mod: GZ,S$GLB,, | Performed by: STUDENT IN AN ORGANIZED HEALTH CARE EDUCATION/TRAINING PROGRAM

## 2023-03-29 PROCEDURE — 99396 PREV VISIT EST AGE 40-64: CPT | Mod: GZ,S$GLB,, | Performed by: STUDENT IN AN ORGANIZED HEALTH CARE EDUCATION/TRAINING PROGRAM

## 2023-03-29 PROCEDURE — 3078F DIAST BP <80 MM HG: CPT | Mod: CPTII,S$GLB,, | Performed by: STUDENT IN AN ORGANIZED HEALTH CARE EDUCATION/TRAINING PROGRAM

## 2023-03-29 PROCEDURE — 3008F PR BODY MASS INDEX (BMI) DOCUMENTED: ICD-10-PCS | Mod: CPTII,S$GLB,, | Performed by: STUDENT IN AN ORGANIZED HEALTH CARE EDUCATION/TRAINING PROGRAM

## 2023-03-29 PROCEDURE — 99999 PR PBB SHADOW E&M-EST. PATIENT-LVL V: ICD-10-PCS | Mod: PBBFAC,,, | Performed by: STUDENT IN AN ORGANIZED HEALTH CARE EDUCATION/TRAINING PROGRAM

## 2023-03-29 PROCEDURE — 1159F PR MEDICATION LIST DOCUMENTED IN MEDICAL RECORD: ICD-10-PCS | Mod: CPTII,S$GLB,, | Performed by: STUDENT IN AN ORGANIZED HEALTH CARE EDUCATION/TRAINING PROGRAM

## 2023-03-29 PROCEDURE — 99499 UNLISTED E&M SERVICE: CPT | Mod: S$GLB,,, | Performed by: STUDENT IN AN ORGANIZED HEALTH CARE EDUCATION/TRAINING PROGRAM

## 2023-03-29 PROCEDURE — 36415 COLL VENOUS BLD VENIPUNCTURE: CPT | Mod: PN | Performed by: STUDENT IN AN ORGANIZED HEALTH CARE EDUCATION/TRAINING PROGRAM

## 2023-03-29 PROCEDURE — 1160F PR REVIEW ALL MEDS BY PRESCRIBER/CLIN PHARMACIST DOCUMENTED: ICD-10-PCS | Mod: CPTII,S$GLB,, | Performed by: STUDENT IN AN ORGANIZED HEALTH CARE EDUCATION/TRAINING PROGRAM

## 2023-03-29 PROCEDURE — 80061 LIPID PANEL: CPT | Performed by: STUDENT IN AN ORGANIZED HEALTH CARE EDUCATION/TRAINING PROGRAM

## 2023-03-29 PROCEDURE — 99214 PR OFFICE/OUTPT VISIT, EST, LEVL IV, 30-39 MIN: ICD-10-PCS | Mod: 25,S$GLB,, | Performed by: STUDENT IN AN ORGANIZED HEALTH CARE EDUCATION/TRAINING PROGRAM

## 2023-03-29 PROCEDURE — 3074F SYST BP LT 130 MM HG: CPT | Mod: CPTII,S$GLB,, | Performed by: STUDENT IN AN ORGANIZED HEALTH CARE EDUCATION/TRAINING PROGRAM

## 2023-03-29 PROCEDURE — 99214 OFFICE O/P EST MOD 30 MIN: CPT | Mod: 25,S$GLB,, | Performed by: STUDENT IN AN ORGANIZED HEALTH CARE EDUCATION/TRAINING PROGRAM

## 2023-03-29 PROCEDURE — 83036 HEMOGLOBIN GLYCOSYLATED A1C: CPT | Performed by: STUDENT IN AN ORGANIZED HEALTH CARE EDUCATION/TRAINING PROGRAM

## 2023-03-29 PROCEDURE — 1159F MED LIST DOCD IN RCRD: CPT | Mod: CPTII,S$GLB,, | Performed by: STUDENT IN AN ORGANIZED HEALTH CARE EDUCATION/TRAINING PROGRAM

## 2023-03-29 PROCEDURE — 1160F RVW MEDS BY RX/DR IN RCRD: CPT | Mod: CPTII,S$GLB,, | Performed by: STUDENT IN AN ORGANIZED HEALTH CARE EDUCATION/TRAINING PROGRAM

## 2023-03-29 PROCEDURE — 3078F PR MOST RECENT DIASTOLIC BLOOD PRESSURE < 80 MM HG: ICD-10-PCS | Mod: CPTII,S$GLB,, | Performed by: STUDENT IN AN ORGANIZED HEALTH CARE EDUCATION/TRAINING PROGRAM

## 2023-03-29 PROCEDURE — 3008F BODY MASS INDEX DOCD: CPT | Mod: CPTII,S$GLB,, | Performed by: STUDENT IN AN ORGANIZED HEALTH CARE EDUCATION/TRAINING PROGRAM

## 2023-03-29 RX ORDER — VENLAFAXINE HYDROCHLORIDE 37.5 MG/1
37.5 CAPSULE, EXTENDED RELEASE ORAL DAILY
Qty: 30 CAPSULE | Refills: 5 | Status: SHIPPED | OUTPATIENT
Start: 2023-03-29 | End: 2023-09-25

## 2023-03-29 NOTE — PROGRESS NOTES
"Primary Care  3/29/2023  Mwengwe Ndhlovu      HPI    Patient is a 57 y.o.   Martha Zarate  has a past medical history of Obesity.    Patient presents with   Chief Complaint   Patient presents with    Establish Care     Patient presenting to establish care     She reports perimenopausal symptoms characterized by hot flashes     Patient also reports chronic pain in knees b/l    Social History     Social History Narrative    , 3 children.     Martha Zarate family history includes Diabetes in her father and mother; Heart disease in her father; Hypertension in her brother, father, and mother; Lupus in her sister.    Active Medications:  Review of patient's allergies indicates:  No Known Allergies  Current Outpatient Medications   Medication Instructions    multivitamin capsule 1 capsule, Oral, Daily    venlafaxine (EFFEXOR-XR) 37.5 mg, Oral, Daily       Review of Systems   Musculoskeletal:  Positive for joint pain.     Vitals:    03/29/23 0958   BP: 120/76   BP Location: Left arm   Pulse: 70   Temp: 97.9 °F (36.6 °C)   SpO2: 99%   Weight: 120.6 kg (265 lb 14 oz)   Height: 5' 4" (1.626 m)       Physical Exam  Vitals reviewed.   Constitutional:       General: She is not in acute distress.  Cardiovascular:      Rate and Rhythm: Normal rate and regular rhythm.      Pulses: Normal pulses.      Heart sounds: Normal heart sounds.   Pulmonary:      Effort: Pulmonary effort is normal.      Breath sounds: Normal breath sounds.   Abdominal:      General: Abdomen is flat. Bowel sounds are normal.      Palpations: Abdomen is soft.   Musculoskeletal:      Right lower leg: No edema.      Left lower leg: No edema.   Neurological:      General: No focal deficit present.      Mental Status: She is oriented to person, place, and time.        Assessment and Plan     Perimenopause   Patient agreeable to pharmacotherapy for hot flashes. Advised GYN referral would be appropriate if considering hormonal therapy   Trial of Effexor 37.5 mg " daily   F/u in 4 weeks     Osteoarthritis in knees b/l   Chronic pain  Patient considering steroid injections   Referral placed to pain mgmt     Screening HLD   Lipid panel     Screening DM   Class 3 obesity   HbA1C    Normocytic anemia   CBC    Routine labs   BMP    Screening colon cancer   Cologuard    Screening breast cancer   Mammogram      Orders Placed This Visit  Orders Placed This Encounter   Procedures    Cologuard Screening (Multitarget Stool DNA)     Standing Status:   Future     Number of Occurrences:   1     Standing Expiration Date:   5/27/2024    Mammo Digital Screening Bilat     Standing Status:   Future     Standing Expiration Date:   3/29/2025     Order Specific Question:   May the Radiologist modify the order per protocol to meet the clinical needs of the patient?     Answer:   Yes     Order Specific Question:   Release to patient     Answer:   Immediate    Lipid Panel     Standing Status:   Future     Number of Occurrences:   1     Standing Expiration Date:   5/27/2024    HEMOGLOBIN A1C     Standing Status:   Future     Number of Occurrences:   1     Standing Expiration Date:   5/27/2024    CBC Without Differential     Standing Status:   Future     Number of Occurrences:   1     Standing Expiration Date:   5/27/2024    Ambulatory referral/consult to Physical/Occupational Therapy     Standing Status:   Future     Standing Expiration Date:   4/29/2024     Referral Priority:   Routine     Referral Type:   Physical Medicine     Referral Reason:   Specialty Services Required     Number of Visits Requested:   1    Ambulatory referral/consult to Pain Clinic     Standing Status:   Future     Standing Expiration Date:   4/29/2024     Referral Priority:   Routine     Referral Type:   Consultation     Referral Reason:   Specialty Services Required     Requested Specialty:   Pain Medicine     Number of Visits Requested:   1           Upcoming Scheduled Appointments and Follow Up:    Future Appointments    Date Time Provider Department [place]   3/29/2023 11:00 AM LAB, [place] [facility] LAB Bernabe Malloy   4/28/2023  9:00 AM Enzo Sales MD [facility] [place]       Follow Up DG/[facility] (with who? when?): Follow up in about 1 month (around 4/29/2023).      Extended Emergency Contact Information  Primary Emergency Contact: Bee Zarate   [place]  Mobile Phone: [phone]  Relation: Spouse      Enzo Sales MD   Attending Physician  Primary Care  3/29/2023 - 10:18 AM    I spent a total of 19 minutes on the day of the visit.This includes face to face time and non-face to face time preparing to see the patient (eg, review of tests), obtaining and/or reviewing separately obtained history, documenting clinical information in the electronic or other health record, independently interpreting results and communicating results to the patient/family/caregiver, or care coordinator.

## 2023-03-29 NOTE — LETTER
March 29, 2023    Martha Zarate  [address]  [facility] [address]             [facility] - Primary [facility]  Primary Care  [address] [facility] [address]  Phone: [phone]  Fax: [phone]   March 29, 2023     Patient: Martha Zarate   YOB: 1965   Date of Visit: 3/29/2023       To Whom it May Concern:    Martha Zarate was seen in my clinic on 3/29/2023.     She is unable to walk up a flight of stairs on a daily basis due to chronic medical condition.     If you have any questions or concerns, please don't hesitate to call.    Sincerely,         Enzo Sales MD

## 2023-04-10 ENCOUNTER — PATIENT MESSAGE (OUTPATIENT)
Dept: PRIMARY CARE CLINIC | Facility: CLINIC | Age: 58
End: 2023-04-10
Payer: MEDICARE

## 2023-04-14 ENCOUNTER — PATIENT MESSAGE (OUTPATIENT)
Dept: ADMINISTRATIVE | Facility: HOSPITAL | Age: 58
End: 2023-04-14
Payer: MEDICARE

## 2023-04-18 ENCOUNTER — CLINICAL SUPPORT (OUTPATIENT)
Dept: REHABILITATION | Facility: HOSPITAL | Age: 58
End: 2023-04-18
Attending: STUDENT IN AN ORGANIZED HEALTH CARE EDUCATION/TRAINING PROGRAM
Payer: MEDICARE

## 2023-04-18 DIAGNOSIS — M25.662 DECREASED RANGE OF MOTION OF BOTH KNEES: ICD-10-CM

## 2023-04-18 DIAGNOSIS — M15.9 PRIMARY OSTEOARTHRITIS INVOLVING MULTIPLE JOINTS: ICD-10-CM

## 2023-04-18 DIAGNOSIS — R29.898 WEAKNESS OF BOTH LOWER EXTREMITIES: ICD-10-CM

## 2023-04-18 DIAGNOSIS — M25.661 DECREASED RANGE OF MOTION OF BOTH KNEES: ICD-10-CM

## 2023-04-18 PROCEDURE — 97140 MANUAL THERAPY 1/> REGIONS: CPT | Mod: PO

## 2023-04-18 PROCEDURE — 97161 PT EVAL LOW COMPLEX 20 MIN: CPT | Mod: PO

## 2023-04-18 PROCEDURE — 97110 THERAPEUTIC EXERCISES: CPT | Mod: PO

## 2023-04-18 NOTE — PLAN OF CARE
[facility]  Physical Therapy Initial Evaluation    Date: 4/18/2023   Name: Martha Zarate  Clinic Number: [id number]    Therapy Diagnosis:   Encounter Diagnoses   Name Primary?    Primary osteoarthritis involving multiple joints     Weakness of both lower extremities     Decreased range of motion of both knees      Physician: Enzo Sales MD    Physician Orders: PT eval and treat  Medical Diagnosis: M15.9 (ICD-10-CM) - Primary osteoarthritis involving multiple joints  Evaluation Date: 4/18/23  Authorization Period Expiration: 6/31/23  Plan of Care Certification Period: 6/31/23  Progress Note Due: 5/18/23    Visit # / Visits authorized: 1/ 1   FOTO: 1/ 3     PTA Visit #: 0/5     Time In: 910 am  Time Out: 950 am  Total Appointment Time (timed & untimed codes): 40 minutes    Precautions: Standard    Subjective   Date of onset: + 5 years continuous and progressive.   History of current condition - Martha reports: PT services 5 years ago with some success no ortho consult or injections in the past. Pt was seen on telehealth visit with new MD and will be seen by pain management for pain.      Medical History:   Past Medical History:   Diagnosis Date    Obesity        Surgical History:   Martha Zarate  has a past surgical history that includes Tubal ligation.    Medications:   Martha has a current medication list which includes the following prescription(s): multivitamin and venlafaxine.    Allergies:   Review of patient's allergies indicates:  No Known Allergies     Imaging, none:     Prior Therapy: yes  Social History: 8 steps to handrails bilaterally  lives with their family  Occupation: non-working  Prior Level of Function: 1 year 30-60 minutes few time a week  Current Level of Function: no recreational exercise just community ambulation.    Pain:  Current 5/10, worst 8/10, best 2/10   Location: bilateral knee R >L  Description: Aching  Aggravating Factors: Standing, Morning, and Getting  out of bed/chair  Easing Factors: relaxation, pain medication, and heating pad    Pts goals: to return to walking 20 minutes without increased pain     Objective         Active/Passive knee ROM: (measured in degrees)    RLE LLE   Extension fllexion 2-94/ 0-105/       Lower Extremity Strength (graded 0-5 out of 5)   RLE LLE   Hip flexion: 5/5 5/5   Hip extension: 4+/5 4+/5   Ankle dorsiflexion: 5/5 5/5   Ankle plantarflexion 5/5 5/5   Knee flexion 5/5 5/5   Knee extension 4+/5 4+/5   Hip adduction 5/5 5/5   Hip abduction 4+/5 4+/5     Special Tests: ((+): pos.; (-): neg.)   Fabers Test: neg  Slump Test: NT  SLR Test: Neg hip  ACL/MCL/PCL/LCL: intact grossly  Jesse/appley: NT  Palpation: patellofemoral pain with grind test, Soft tissue tenderness, distal quads, adductors, vastus lateralis  Gait: decreased pankaj decrease hip extension    CMS Impairment/Limitation/Restriction for FOTO knee Survey    Therapist reviewed FOTO scores for Martha Zarate on 4/18/2023.   FOTO documents entered into [employer] - see Media section.    Limitation Score: 49%  Category: Other             TREATMENT   Treatment Time In: 925  Treatment Time Out: 950 am  Total Treatment time (time-based codes) separate from Evaluation: 25 minutes    Martha received therapeutic exercises to develop strength, endurance, ROM, posture, and core stabilization for 15 minutes including:      Bridges with add ball 3 x 10  Bridges with abd BTB 3 x 10  Supine clams BTB 3 x 10        Hip add/frog stretch 3 x 30 secs    SKTC 3 x 30 secs  Willi test position hip flexor stretch 3 x 30 secs     Quad set 2 x 10 hold 3 secs with towel roll under knee  SLR 2 x 10 slow pankaj.    Martha received the following manual therapy techniques: Manual traction, Myofacial release, and Soft tissue Mobilization were applied to the: adductors, distal quads vastus lateralis for 10 minutes, including:  Long axis traction   Short axis traction      Martha participated in dynamic  functional therapeutic activities to improve functional performance for 0  minutes, including:      Martha participated in gait training to improve functional mobility and safety for 0  minutes, including:          Martha received hot pack for 0 minutes to thigh.    Martha received cold pack for 0 minutes to knee.    Home Exercises and Patient Education Provided    Education provided:   - HEP, pain management    Written Home Exercises Provided: Patient instructed to cont prior HEP.  Exercises were reviewed and Martha was able to demonstrate them prior to the end of the session.  Martha demonstrated good  understanding of the education provided.     See EMR under Patient Instructions for exercises provided 4/18/2023.    Assessment   Martha is a 57 y.o. female referred to outpatient Physical Therapy with a medical diagnosis of M15.9 (ICD-10-CM) - Primary osteoarthritis involving multiple joints. Pt presents with decreased B knee AROM and mild decreased bilateral knee and hip strength with decreased overall gait skills. Pt reported pattern with pain in morning and with prolonged position with relief upon movement and other characteristics that is indicative of OA. PT recommend HEP and improvement of strength and AROM to improve overall efficiency with mobility.     Pt prognosis is Good.   Pt will benefit from skilled outpatient Physical Therapy to address the deficits stated above and in the chart below, provide pt/family education, and to maximize pt's level of independence.     Plan of care discussed with patient: Yes  Pt's spiritual, cultural and educational needs considered and patient is agreeable to the plan of care and goals as stated below:     Anticipated Barriers for therapy: none    Medical Necessity is demonstrated by the following  History  Co-morbidities and personal factors that may impact the plan of care Co-morbidities:   See pMHx    Personal Factors:   no deficits     low   Examination  Body Structures and  Functions, activity limitations and participation restrictions that may impact the plan of care Body Regions:   lower extremities    Body Systems:    gross symmetry  ROM  strength  gait    Participation Restrictions:   none    Activity limitations:   Learning and applying knowledge  no deficits    General Tasks and Commands  no deficits    Communication  no deficits    Mobility  lifting and carrying objects  walking    Self care  no deficits    Domestic Life  doing house work (cleaning house, washing dishes, laundry)    Interactions/Relationships  no deficits    Life Areas  no deficits    Community and Social Life  community life  recreation and leisure         low   Clinical Presentation stable and uncomplicated low   Decision Making/ Complexity Score: low     Goals:STG 3 weeks  1.  Pt to be independent with HEP for increased functional mobility and pain control.  2.  Pt to increase AROM at B knee flexion extension 0-100 degs for increased functional mobility and transfers.  3.  Pt to decrease pain to less than 2/10 after session for increased functional mobility.    Long Term Goals: 8 weeks   1.  Pt to be independent with pain management strategies and verbalize 2 strategies for increased functional mobility and pain control.  2.  Pt to increase AROM at B knee flexion extension 0-110 degs for increased functional mobility and transfers.  3.  Pt to decrease pain to less than 1/10 after session for increased functional mobility.  4.  Pt to increased strength in B knees flexion and extension to 5/5 for increased mobility.  5.  Pt to increased tolerance to 25 min continuous walking to return to prior recreational activity level.     Plan   Plan of care Certification: 4/18/2023 to 6/31/23.    Outpatient Physical Therapy 2 times weekly for 10 weeks to include the following interventions: Cervical/Lumbar Traction, Electrical Stimulation ,, Gait Training, Manual Therapy, Moist Heat/ Ice, Neuromuscular Re-ed, Patient  Education, Therapeutic Activities, and Therapeutic Exercise.     Elidia Huber, PT

## 2023-04-19 LAB — NONINV COLON CA DNA+OCC BLD SCRN STL QL: NEGATIVE

## 2023-04-25 NOTE — PROGRESS NOTES
[facility]   Physical Therapy Treatment Note     Name: Martha SCHRADER [facility] Number: [id number]    Therapy Diagnosis: No diagnosis found.  Physician: Enzo Sales MD    Visit Date: 4/26/2023    Physician Orders: PT eval and treat  Medical Diagnosis: M15.9 (ICD-10-CM) - Primary osteoarthritis involving multiple joints  Evaluation Date: 4/18/23  Authorization Period Expiration: 6/31/23  Plan of Care Certification Period: 6/31/23  Progress Note Due: 5/18/23    Visit # / Visits authorized: 1/ 1   FOTO: 1/ 3     PTA Visit #: 0/5     Precautions: Standard    Time In: 928 am  Time Out: 1006 am  Total Billable Time: 38 minutes MT 1, TE 2 + mHP x 10 min after session.       SUBJECTIVE     Pt reports: no issues with exercise and good relief last time. .  She was compliant with home exercise program.  Response to previous treatment: good relief with session for a day  Functional change: no changes at this time    Pain: 6/10 pre and 0/10 post  Location: bilateral knee      OBJECTIVE     Objective Measures updated at progress report unless specified.       TREATMENT     Total Treatment time (time-based codes) separate from Evaluation: 38 minutes     Martha received the treatments listed below:      Martha received therapeutic exercises to develop strength, endurance, ROM, posture, and core stabilization for 28 minutes including:        Bridges with add ball 2 x 10  Supine clams BTB 3 x 10  Bridges with abd BTB 3 x 10    Hip add/frog stretch 3 x 30 secs     SKTC 3 x 30 secs  Willi test position hip flexor stretch 3 x 30 secs      Quad set 2 x 10 hold 3 secs with towel roll under knee  SLR 2 x 10 slow pankaj.  HS curl with Blue theraband in sitting and supine x 20 each position    HS stretch supine 2 x 30 secs  HS stretch sitting 2 x 30 secs    Attempted recumbent bike no tolerated at this time     Martha received the following manual therapy techniques: Manual traction, Myofacial release, and Soft  tissue Mobilization were applied to the: adductors, distal quads vastus lateralis for 10 minutes, including:  Long axis traction   Short axis traction        Martha participated in dynamic functional therapeutic activities to improve functional performance for 0  minutes, including:        Martha participated in gait training to improve functional mobility and safety for 0  minutes, including:              Martha received hot pack for 0 minutes to thigh.     Martha received cold pack for 0 minutes to knee.         PATIENT EDUCATION AND HOME EXERCISES     Home Exercises Provided and Patient Education Provided     Education provided:   - HEP, pain management    Written Home Exercises Provided: Patient instructed to cont prior HEP. Exercises were reviewed and Martha was able to demonstrate them prior to the end of the session.  Martha demonstrated good  understanding of the education provided. See EMR under Patient Instructions for exercises provided during therapy sessions    ASSESSMENT     Pt with decreased overall pain in session and during the day. Pt reported HS tightness with exercise due to fatigue with use secondary support and stability and addressed with HS exercise and stretch at end of session. Pt attempted recumbent bike but deferred due to lack of knee and hip flexion.    Martha Is progressing well towards her goals.   Pt prognosis is Good.     Pt will continue to benefit from skilled outpatient physical therapy to address the deficits listed in the problem list box on initial evaluation, provide pt/family education and to maximize pt's level of independence in the home and community environment.     Pt's spiritual, cultural and educational needs considered and pt agreeable to plan of care and goals.     Anticipated barriers to physical therapy: chronic nature     Goals:   STG 3 weeks  1.  Pt to be independent with HEP for increased functional mobility and pain control. Progressing 4/26/23  2.  Pt to increase  AROM at B knee flexion extension 0-100 degs for increased functional mobility and transfers.  3.  Pt to decrease pain to less than 2/10 after session for increased functional mobility. MET 4/26/23     Long Term Goals: 8 weeks   1.  Pt to be independent with pain management strategies and verbalize 2 strategies for increased functional mobility and pain control.  2.  Pt to increase AROM at B knee flexion extension 0-110 degs for increased functional mobility and transfers.  3.  Pt to decrease pain to less than 1/10 after session for increased functional mobility.  4.  Pt to increased strength in B knees flexion and extension to 5/5 for increased mobility.  5.  Pt to increased tolerance to 25 min continuous walking to return to prior recreational activity level.     PLAN     Cont with KAZ Huber, PT

## 2023-04-26 ENCOUNTER — CLINICAL SUPPORT (OUTPATIENT)
Dept: REHABILITATION | Facility: HOSPITAL | Age: 58
End: 2023-04-26
Payer: MEDICARE

## 2023-04-26 DIAGNOSIS — R29.898 WEAKNESS OF BOTH LOWER EXTREMITIES: Primary | ICD-10-CM

## 2023-04-26 DIAGNOSIS — M25.661 DECREASED RANGE OF MOTION OF BOTH KNEES: ICD-10-CM

## 2023-04-26 DIAGNOSIS — M25.662 DECREASED RANGE OF MOTION OF BOTH KNEES: ICD-10-CM

## 2023-04-26 PROCEDURE — 97010 HOT OR COLD PACKS THERAPY: CPT | Mod: PO

## 2023-04-26 PROCEDURE — 97140 MANUAL THERAPY 1/> REGIONS: CPT | Mod: PO

## 2023-04-26 PROCEDURE — 97110 THERAPEUTIC EXERCISES: CPT | Mod: PO

## 2023-05-02 ENCOUNTER — CLINICAL SUPPORT (OUTPATIENT)
Dept: REHABILITATION | Facility: HOSPITAL | Age: 58
End: 2023-05-02
Payer: MEDICARE

## 2023-05-02 DIAGNOSIS — M25.661 DECREASED RANGE OF MOTION OF BOTH KNEES: ICD-10-CM

## 2023-05-02 DIAGNOSIS — M25.662 DECREASED RANGE OF MOTION OF BOTH KNEES: ICD-10-CM

## 2023-05-02 DIAGNOSIS — R29.898 WEAKNESS OF BOTH LOWER EXTREMITIES: Primary | ICD-10-CM

## 2023-05-02 PROCEDURE — 97110 THERAPEUTIC EXERCISES: CPT | Mod: PO,CQ

## 2023-05-02 NOTE — PROGRESS NOTES
[facility]   Physical Therapy Treatment Note     Name: Martha SCHRADER [facility] Number: [id number]    Therapy Diagnosis:   Encounter Diagnoses   Name Primary?    Weakness of both lower extremities Yes    Decreased range of motion of both knees      Physician: Enzo Sales MD    Visit Date: 5/2/2023    Physician Orders: PT eval and treat  Medical Diagnosis: M15.9 (ICD-10-CM) - Primary osteoarthritis involving multiple joints  Evaluation Date: 4/18/23  Authorization Period Expiration: 6/31/23  Plan of Care Certification Period: 6/31/23  Progress Note Due: 5/18/23    Visit # / Visits authorized: 2/11   FOTO: 1/ 3     PTA Visit #: 1/5     Precautions: Standard    Time In: 10:45 am  Time Out: 11:25 am  Total Billable Time: 40 minutes MT 0, TE 3 + mHP x 0 min after session.       SUBJECTIVE     Pt reports: her R knee gives her more trouble. She was sore after last visit.  She was compliant with home exercise program.  Response to previous treatment: good relief with session for a day  Functional change: no changes at this time    Pain: 7/10 pre and 0/10 post  Location: bilateral knee    OBJECTIVE     Objective Measures updated at progress report unless specified.       TREATMENT     Total Treatment time (time-based codes) separate from Evaluation: 38 minutes     Martha received the treatments listed below:      Martha received therapeutic exercises to develop strength, endurance, ROM, posture, and core stabilization for 40 minutes including:     +Recumbent bike 5 mins  Bridges with add ball 2 x 10  Supine clams BTB 3 x 10  Bridges with abd BTB 3 x 10    Hip add/frog stretch 3 x 30 secs     SKTC 3 x 30 secs  Willi test position hip flexor stretch 3 x 30 secs    Quad set 2 x 10 hold 3 secs with towel roll under knee  SLR 2 x 10 slow pankaj  HS curl with Blue theraband in sitting and supine x20 each position    HS stretch supine 2 x 30 secs  HS stretch sitting 2 x 30 secs     Mratha received the  following manual therapy techniques: Manual traction, Myofacial release, and Soft tissue Mobilization were applied to the: adductors, distal quads vastus lateralis for 0 minutes, including:  Long axis traction   Short axis traction     Martha participated in dynamic functional therapeutic activities to improve functional performance for 0  minutes, including:     Martha participated in gait training to improve functional mobility and safety for 0  minutes, including:     Martha received hot pack for 0 minutes to thigh.     Martha received cold pack for 0 minutes to knee.      PATIENT EDUCATION AND HOME EXERCISES     Home Exercises Provided and Patient Education Provided     Education provided:   - HEP, pain management    Written Home Exercises Provided: Patient instructed to cont prior HEP. Exercises were reviewed and Martha was able to demonstrate them prior to the end of the session.  Martha demonstrated good  understanding of the education provided. See EMR under Patient Instructions for exercises provided during therapy sessions    ASSESSMENT     Patient tolerated today's treatment well. She performed all exercises with good carryover and requires few verbal cues for proper form and muscle engagement. Patient with good tolerance to addition of recumbent bike and was able to achieve full revolutions without hip hiking. Continue to monitor and progress as able.    Martha Is progressing well towards her goals.   Pt prognosis is Good.     Pt will continue to benefit from skilled outpatient physical therapy to address the deficits listed in the problem list box on initial evaluation, provide pt/family education and to maximize pt's level of independence in the home and community environment.     Pt's spiritual, cultural and educational needs considered and pt agreeable to plan of care and goals.     Anticipated barriers to physical therapy: chronic nature     Goals:   STG 3 weeks  1.  Pt to be independent with HEP for  increased functional mobility and pain control. Progressing 4/26/23  2.  Pt to increase AROM at B knee flexion extension 0-100 degs for increased functional mobility and transfers.  3.  Pt to decrease pain to less than 2/10 after session for increased functional mobility. MET 4/26/23     Long Term Goals: 8 weeks   1.  Pt to be independent with pain management strategies and verbalize 2 strategies for increased functional mobility and pain control.  2.  Pt to increase AROM at B knee flexion extension 0-110 degs for increased functional mobility and transfers.  3.  Pt to decrease pain to less than 1/10 after session for increased functional mobility.  4.  Pt to increased strength in B knees flexion and extension to 5/5 for increased mobility.  5.  Pt to increased tolerance to 25 min continuous walking to return to prior recreational activity level.     PLAN     Cont with KAZ Byrne, PTA

## 2023-05-04 ENCOUNTER — CLINICAL SUPPORT (OUTPATIENT)
Dept: REHABILITATION | Facility: HOSPITAL | Age: 58
End: 2023-05-04
Payer: MEDICARE

## 2023-05-04 DIAGNOSIS — M25.662 DECREASED RANGE OF MOTION OF BOTH KNEES: ICD-10-CM

## 2023-05-04 DIAGNOSIS — M25.661 DECREASED RANGE OF MOTION OF BOTH KNEES: ICD-10-CM

## 2023-05-04 DIAGNOSIS — R29.898 WEAKNESS OF BOTH LOWER EXTREMITIES: Primary | ICD-10-CM

## 2023-05-04 PROCEDURE — 97110 THERAPEUTIC EXERCISES: CPT | Mod: PO

## 2023-05-04 NOTE — PROGRESS NOTES
[facility]   Physical Therapy Treatment Note     Name: Martha SCHRADER [facility] Number: [id number]    Therapy Diagnosis:   Encounter Diagnoses   Name Primary?    Weakness of both lower extremities Yes    Decreased range of motion of both knees        Physician: Enzo Sales MD    Visit Date: 5/4/2023    Physician Orders: PT eval and treat  Medical Diagnosis: M15.9 (ICD-10-CM) - Primary osteoarthritis involving multiple joints  Evaluation Date: 4/18/23  Authorization Period Expiration: 6/31/23  Plan of Care Certification Period: 6/31/23  Progress Note Due: 5/18/23    Visit # / Visits authorized: 3/11   FOTO: 1/ 3     PTA Visit #: 0/5     Precautions: Standard    Time In: 9:30  Time Out: 10:09  Total Billable Time: 39 minutes MT 0, TE 3 + mHP x 0 min after session.       SUBJECTIVE     Pt reports: her knees are still hurting but she has been working on her exercises  She was compliant with home exercise program.  Response to previous treatment: good relief with session for a day  Functional change: no changes at this time    Pain: 7/10 pre and 0/10 post  Location: bilateral knee    OBJECTIVE     Objective Measures updated at progress report unless specified.       TREATMENT     Martha received the treatments listed below:    Bold=performed  Martha received therapeutic exercises to develop strength, endurance, ROM, posture, and core stabilization for 39 minutes including:     Recumbent bike 5 mins  +nustep x6 min  Quad set 2 x 10 hold 3 secs with towel roll under knee  Bridges with add ball 2 x 10  Supine clams BTB 3 x 10  Bridges with abd BTB 3 x 10  SLR 2 x 10 slow pankaj  +SAQ 2x10 B  HS stretch supine 2 x 30 secs  Hip add/frog stretch 3 x 30 secs  Willi test position hip flexor stretch 3 x 30 secs  HS stretch sitting 2 x 30 secs  HS curl with Blue theraband in sitting and supine x20 each position  +LAQ 2x10 B      SKTC 3 x 30 secs       Martha received the following manual therapy  techniques: Manual traction, Myofacial release, and Soft tissue Mobilization were applied to the: adductors, distal quads vastus lateralis for 0 minutes, including:  Long axis traction   Short axis traction     Martha participated in dynamic functional therapeutic activities to improve functional performance for 0  minutes, including:     Martha participated in gait training to improve functional mobility and safety for 0  minutes, including:     Martha received hot pack for 0 minutes to thigh.     Martha received cold pack for 0 minutes to knee.      PATIENT EDUCATION AND HOME EXERCISES     Home Exercises Provided and Patient Education Provided     Education provided:   - HEP, pain management    Written Home Exercises Provided: Patient instructed to cont prior HEP. Exercises were reviewed and Martha was able to demonstrate them prior to the end of the session.  Martha demonstrated good  understanding of the education provided. See EMR under Patient Instructions for exercises provided during therapy sessions    ASSESSMENT     Martha tolerated session well and has decreased pain as compared to last visit indicating a positive trend in her recovery. She is still reporting that the right knee is hurting worse than the left, but is able to complete all exercises on bilateral lower extremity without issues. There is intermittent audible clicking in the left knee but she is not reporting pain with this. We will continue to progress as tolerated.     Martha Is progressing well towards her goals.   Pt prognosis is Good.     Pt will continue to benefit from skilled outpatient physical therapy to address the deficits listed in the problem list box on initial evaluation, provide pt/family education and to maximize pt's level of independence in the home and community environment.     Pt's spiritual, cultural and educational needs considered and pt agreeable to plan of care and goals.     Anticipated barriers to physical therapy:  chronic nature     Goals:   STG 3 weeks  1.  Pt to be independent with HEP for increased functional mobility and pain control. Progressing 4/26/23  2.  Pt to increase AROM at B knee flexion extension 0-100 degs for increased functional mobility and transfers.  3.  Pt to decrease pain to less than 2/10 after session for increased functional mobility. MET 4/26/23     Long Term Goals: 8 weeks   1.  Pt to be independent with pain management strategies and verbalize 2 strategies for increased functional mobility and pain control.  2.  Pt to increase AROM at B knee flexion extension 0-110 degs for increased functional mobility and transfers.  3.  Pt to decrease pain to less than 1/10 after session for increased functional mobility.  4.  Pt to increased strength in B knees flexion and extension to 5/5 for increased mobility.  5.  Pt to increased tolerance to 25 min continuous walking to return to prior recreational activity level.     PLAN     Cont with KAZ Davenport, PT

## 2023-05-12 ENCOUNTER — CLINICAL SUPPORT (OUTPATIENT)
Dept: REHABILITATION | Facility: HOSPITAL | Age: 58
End: 2023-05-12
Payer: MEDICARE

## 2023-05-12 DIAGNOSIS — M25.662 DECREASED RANGE OF MOTION OF BOTH KNEES: ICD-10-CM

## 2023-05-12 DIAGNOSIS — M25.661 DECREASED RANGE OF MOTION OF BOTH KNEES: ICD-10-CM

## 2023-05-12 DIAGNOSIS — R29.898 WEAKNESS OF BOTH LOWER EXTREMITIES: Primary | ICD-10-CM

## 2023-05-12 PROCEDURE — 97530 THERAPEUTIC ACTIVITIES: CPT | Mod: PO

## 2023-05-12 PROCEDURE — 97110 THERAPEUTIC EXERCISES: CPT | Mod: PO

## 2023-05-12 NOTE — PROGRESS NOTES
[facility]   Physical Therapy Treatment Note     Name: Martha SCHRADER [facility] Number: [id number]    Therapy Diagnosis:   Encounter Diagnoses   Name Primary?    Weakness of both lower extremities Yes    Decreased range of motion of both knees          Physician: Enzo Sales MD    Visit Date: 5/12/2023    Physician Orders: PT eval and treat  Medical Diagnosis: M15.9 (ICD-10-CM) - Primary osteoarthritis involving multiple joints  Evaluation Date: 4/18/23  Authorization Period Expiration: 6/31/23  Plan of Care Certification Period: 6/31/23  Progress Note Due: 5/18/23    Visit # / Visits authorized: 4/11   FOTO: 2/ 3 - last completed 5/12/23   PTA Visit #: 0/5     Precautions: Standard    Time In: 9:00  Time Out: 9:43  Total Billable Time: 43 minutes MT 0, TE 3 + mHP x 0 min after session.       SUBJECTIVE     Pt reports: feeling better   She was compliant with home exercise program.  Response to previous treatment: good relief with session for a day  Functional change: no changes at this time    Pain: 6.5/10 pre and 0/10 post  Location: bilateral knee    OBJECTIVE     Objective Measures updated at progress report unless specified.     TREATMENT     Martha received the treatments listed below:    Bold=performed  Martha received therapeutic exercises to develop strength, endurance, ROM, posture, and core stabilization for 35 minutes including:     Recumbent bike 5 mins  nustep x6 min  Quad set 2 x 10 hold 3 secs with towel roll under knee  Bridges with add ball 2 x 10  +Sidelying clams BTB 2 x 10  Bridges with abd BTB 3 x 10  SLR 2 x 10 slow pankaj  SAQ 2x10 B  HS stretch supine 2 x 30 secs  Hip add/frog stretch 3 x 30 secs  Willi test position hip flexor stretch 3 x 30 secs  HS stretch sitting 2 x 30 secs  HS curl with Blue theraband in sitting and supine x20 each position  +LAQ 2x10 B      SKTC 3 x 30 secs       Martha received the following manual therapy techniques: Manual traction,  Myofacial release, and Soft tissue Mobilization were applied to the: adductors, distal quads vastus lateralis for 0 minutes, including:  Long axis traction   Short axis traction     Martha participated in dynamic functional therapeutic activities to improve functional performance for 8 minutes, including:  +sit to stands 2x10  +leg press on shuttle 2 blk bands 2x10     Martha participated in gait training to improve functional mobility and safety for 0  minutes, including:     Martha received hot pack for 0 minutes to thigh.     Martha received cold pack for 0 minutes to knee.      PATIENT EDUCATION AND HOME EXERCISES     Home Exercises Provided and Patient Education Provided     Education provided:   - HEP, pain management    Written Home Exercises Provided: Patient instructed to cont prior HEP. Exercises were reviewed and Martha was able to demonstrate them prior to the end of the session.  Martha demonstrated good  understanding of the education provided. See EMR under Patient Instructions for exercises provided during therapy sessions    ASSESSMENT     Martha again tolerated session well with no reports of pain. We were able to progress several exercises today without issue and will continue to progress as tolerated.     Martha Is progressing well towards her goals.   Pt prognosis is Good.     Pt will continue to benefit from skilled outpatient physical therapy to address the deficits listed in the problem list box on initial evaluation, provide pt/family education and to maximize pt's level of independence in the home and community environment.     Pt's spiritual, cultural and educational needs considered and pt agreeable to plan of care and goals.     Anticipated barriers to physical therapy: chronic nature     Goals:   STG 3 weeks  1.  Pt to be independent with HEP for increased functional mobility and pain control. Progressing 4/26/23  2.  Pt to increase AROM at B knee flexion extension 0-100 degs for increased  functional mobility and transfers.  3.  Pt to decrease pain to less than 2/10 after session for increased functional mobility. MET 4/26/23     Long Term Goals: 8 weeks   1.  Pt to be independent with pain management strategies and verbalize 2 strategies for increased functional mobility and pain control.  2.  Pt to increase AROM at B knee flexion extension 0-110 degs for increased functional mobility and transfers.  3.  Pt to decrease pain to less than 1/10 after session for increased functional mobility.  4.  Pt to increased strength in B knees flexion and extension to 5/5 for increased mobility.  5.  Pt to increased tolerance to 25 min continuous walking to return to prior recreational activity level.     PLAN     Cont with POC    Itzel Davenport, PT

## 2023-05-19 ENCOUNTER — CLINICAL SUPPORT (OUTPATIENT)
Dept: REHABILITATION | Facility: HOSPITAL | Age: 58
End: 2023-05-19
Payer: MEDICARE

## 2023-05-19 DIAGNOSIS — M25.661 DECREASED RANGE OF MOTION OF BOTH KNEES: ICD-10-CM

## 2023-05-19 DIAGNOSIS — R29.898 WEAKNESS OF BOTH LOWER EXTREMITIES: Primary | ICD-10-CM

## 2023-05-19 DIAGNOSIS — M25.662 DECREASED RANGE OF MOTION OF BOTH KNEES: ICD-10-CM

## 2023-05-19 PROCEDURE — 97530 THERAPEUTIC ACTIVITIES: CPT | Mod: PO,CQ

## 2023-05-19 PROCEDURE — 97110 THERAPEUTIC EXERCISES: CPT | Mod: PO,CQ

## 2023-05-19 NOTE — PROGRESS NOTES
[facility]   Physical Therapy Treatment Note     Name: Martha SCHRAEDR [facility] Number: [id number]    Therapy Diagnosis:   Encounter Diagnoses   Name Primary?    Weakness of both lower extremities Yes    Decreased range of motion of both knees      Physician: Enzo Sales MD    Visit Date: 5/19/2023    Physician Orders: PT eval and treat  Medical Diagnosis: M15.9 (ICD-10-CM) - Primary osteoarthritis involving multiple joints  Evaluation Date: 4/18/23  Authorization Period Expiration: 6/31/23  Plan of Care Certification Period: 6/31/23  Progress Note Due: 5/18/23    Visit # / Visits authorized: 5/11   FOTO: 2/ 3 - last completed 5/12/23   PTA Visit #: 1/5     Precautions: Standard    Time In: 9:00 am  Time Out: 9:43 am  Total Billable Time: 43 minutes MT 0, TE 2, TA 1 + mHP x 0 min after session.       SUBJECTIVE     Pt reports: knee is hurting today.  She was compliant with home exercise program.  Response to previous treatment: good relief with session for a day  Functional change: no changes at this time    Pain: 7/10 pre and 0/10 post  Location: bilateral knee    OBJECTIVE     Objective Measures updated at progress report unless specified.     TREATMENT     Martha received the treatments listed below:    Bold=performed  Martha received therapeutic exercises to develop strength, endurance, ROM, posture, and core stabilization for 35 minutes including:     Recumbent bike 5 mins  nustep x6 min level 2  Quad set 2 x 10 hold 3 secs with towel roll under knee  Bridges with add ball 2 x 10  Sidelying clams BTB 2 x 10  Bridges with abd BTB 3 x 10  SLR 2 x 10 slow pankaj B  SAQ 2x10 B  HS stretch supine 2 x 30 secs  Hip add/frog stretch 3 x 30 secs  Willi test position hip flexor stretch 3 x 30 secs  HS stretch sitting 2 x 30 secs  HS curl with Blue theraband in sitting and supine x20 each position  LAQ 2# 2x10 B    SKTC 3 x 30 secs    Martha received the following manual therapy techniques:  Manual traction, Myofacial release, and Soft tissue Mobilization were applied to the: adductors, distal quads vastus lateralis for 0 minutes, including:  Long axis traction   Short axis traction     Martha participated in dynamic functional therapeutic activities to improve functional performance for 8 minutes, including:  Sit to stands 2x10  Leg press on shuttle 2 blk bands 2x10     Martha participated in gait training to improve functional mobility and safety for 0  minutes, including:     Martha received hot pack for 0 minutes to thigh.     Martha received cold pack for 0 minutes to knee.      PATIENT EDUCATION AND HOME EXERCISES     Home Exercises Provided and Patient Education Provided     Education provided:   - HEP, pain management    Written Home Exercises Provided: Patient instructed to cont prior HEP. Exercises were reviewed and Martha was able to demonstrate them prior to the end of the session.  Martha demonstrated good  understanding of the education provided. See EMR under Patient Instructions for exercises provided during therapy sessions    ASSESSMENT     Martha remains with good tolerance to exercises with no issues or pain to report. She requires verbal cues for quad activation and eccentric control during straight leg raises. Patient remains challenged by sit to stands but is able to execute with good form and muscle engagement. Continue to monitor and progress as able.    Martha Is progressing well towards her goals.   Pt prognosis is Good.     Pt will continue to benefit from skilled outpatient physical therapy to address the deficits listed in the problem list box on initial evaluation, provide pt/family education and to maximize pt's level of independence in the home and community environment.     Pt's spiritual, cultural and educational needs considered and pt agreeable to plan of care and goals.     Anticipated barriers to physical therapy: chronic nature     Goals:   STG 3 weeks  1.  Pt to be  independent with HEP for increased functional mobility and pain control. Progressing 4/26/23  2.  Pt to increase AROM at B knee flexion extension 0-100 degs for increased functional mobility and transfers.  3.  Pt to decrease pain to less than 2/10 after session for increased functional mobility. MET 4/26/23     Long Term Goals: 8 weeks   1.  Pt to be independent with pain management strategies and verbalize 2 strategies for increased functional mobility and pain control.  2.  Pt to increase AROM at B knee flexion extension 0-110 degs for increased functional mobility and transfers.  3.  Pt to decrease pain to less than 1/10 after session for increased functional mobility.  4.  Pt to increased strength in B knees flexion and extension to 5/5 for increased mobility.  5.  Pt to increased tolerance to 25 min continuous walking to return to prior recreational activity level.     PLAN     Cont with KAZ Byrne, PTA

## 2023-05-30 ENCOUNTER — HOSPITAL ENCOUNTER (OUTPATIENT)
Dept: RADIOLOGY | Facility: HOSPITAL | Age: 58
Discharge: HOME | End: 2023-05-30
Attending: STUDENT IN AN ORGANIZED HEALTH CARE EDUCATION/TRAINING PROGRAM
Payer: MEDICARE

## 2023-05-30 DIAGNOSIS — Z12.31 SCREENING MAMMOGRAM FOR BREAST CANCER: ICD-10-CM

## 2023-05-30 PROCEDURE — 77063 MAMMO DIGITAL SCREENING BILAT WITH TOMO: ICD-10-PCS | Mod: 26,,, | Performed by: RADIOLOGY

## 2023-05-30 PROCEDURE — 77067 SCR MAMMO BI INCL CAD: CPT | Mod: TC

## 2023-05-30 PROCEDURE — 77067 SCR MAMMO BI INCL CAD: CPT | Mod: 26,,, | Performed by: RADIOLOGY

## 2023-05-30 PROCEDURE — 77067 MAMMO DIGITAL SCREENING BILAT WITH TOMO: ICD-10-PCS | Mod: 26,,, | Performed by: RADIOLOGY

## 2023-05-30 PROCEDURE — 77063 BREAST TOMOSYNTHESIS BI: CPT | Mod: 26,,, | Performed by: RADIOLOGY

## 2023-06-06 ENCOUNTER — CLINICAL SUPPORT (OUTPATIENT)
Dept: REHABILITATION | Facility: HOSPITAL | Age: 58
End: 2023-06-06
Payer: MEDICARE

## 2023-06-06 DIAGNOSIS — R29.898 WEAKNESS OF BOTH LOWER EXTREMITIES: Primary | ICD-10-CM

## 2023-06-06 DIAGNOSIS — M25.662 DECREASED RANGE OF MOTION OF BOTH KNEES: ICD-10-CM

## 2023-06-06 DIAGNOSIS — M25.661 DECREASED RANGE OF MOTION OF BOTH KNEES: ICD-10-CM

## 2023-06-06 PROCEDURE — 97110 THERAPEUTIC EXERCISES: CPT | Mod: PO

## 2023-06-06 NOTE — PROGRESS NOTES
[facility]   Physical Therapy Treatment Note     Name: Martha SCHRADER [facility] Number: [id number]    Therapy Diagnosis:   Encounter Diagnoses   Name Primary?    Weakness of both lower extremities Yes    Decreased range of motion of both knees      Physician: Enzo Sales MD    Visit Date: 6/6/2023    Physician Orders: PT eval and treat  Medical Diagnosis: M15.9 (ICD-10-CM) - Primary osteoarthritis involving multiple joints  Evaluation Date: 4/18/23  Authorization Period Expiration: 6/31/23  Plan of Care Certification Period: 6/31/23  Progress Note Due: 7/6/23    Visit # / Visits authorized: 6/11   FOTO: 3/ 3 - last completed 6/6/23 and discharge foto  PTA Visit #: 1/5     Precautions: Standard    Time In: 9:10 am  Time Out: 9:55 am  Total Billable Time: 45 minutes MT 0, TE 3 + mHP x 0 min after session.       SUBJECTIVE     Pt reports: knee is hurting today just medium  She was compliant with home exercise program.  Response to previous treatment: good relief with session for a day  Functional change: no changes at this time    Pain: 5/10 pre and 0/10 post  Location: bilateral knee    OBJECTIVE     6/6/23  Active/Passive knee ROM: (measured in degrees)     RLE LLE   Extension fllexion 2-92/0-95 pre session     0-112/0-120      FOTO: no change still at 47%    TREATMENT     Martha received the treatments listed below:    Bold=performed  Martha received therapeutic exercises to develop strength, endurance, ROM, posture, and core stabilization for 40 minutes including:   + reassessment noted in objective section   Recumbent bike 5 mins  Pass prolonged into knee extensionwith 4# on knee 2 x 3 min ins session  nustep x6 min level 2  Quad set 2 x 10 hold 3 secs with towel roll under knee  Bridges with add ball 2 x 10  Sidelying clams BTB 2 x 10  Bridges with abd BTB 3 x 10  SLR 2 x 10 slow pankaj B  SAQ 2x10 B 4#  HS stretch supine 2 x 30 secs  HS stretch sitting 3 x 30 secs  Hip add/frog  stretch 3 x 30 secs  Willi test position hip flexor stretch 3 x 30 secs  HS stretch sitting 2 x 30 secs  HS curl with Blue theraband in sitting and supine x20 each position  LAQ 4# 2x10 B    SKTC 3 x 30 secs    Martha received the following manual therapy techniques: Manual traction, Myofacial release, and Soft tissue Mobilization were applied to the: adductors, distal quads vastus lateralis for 5 minutes, including:  Long axis traction   Short axis traction  PA AP at joint line into TKE with towel roll under knee      Martha participated in dynamic functional therapeutic activities to improve functional performance for 0 minutes, including:  Sit to stands 2x10  Leg press on shuttle 2 blk bands 2x10     Martha participated in gait training to improve functional mobility and safety for 0  minutes, including:     Martha received hot pack for 0 minutes to thigh.     Martha received cold pack for 0 minutes to knee.      PATIENT EDUCATION AND HOME EXERCISES     Home Exercises Provided and Patient Education Provided     Education provided:   - HEP, pain management    Written Home Exercises Provided: Patient instructed to cont prior HEP. Exercises were reviewed and Martha was able to demonstrate them prior to the end of the session.  Martha demonstrated good  understanding of the education provided. See EMR under Patient Instructions for exercises provided during therapy sessions    ASSESSMENT     Martha remains with good tolerance to exercises achieved PROM into full extension but limited overall carryover from session to session. Pt education with achieving the TKE with prolonged passive stretch into extension with heel prop in supine or edge of mat with prone positioning. Pt achieved only 1 set goals progressing.     Martha Is progressing well towards her goals.   Pt prognosis is Good.     Pt will continue to benefit from skilled outpatient physical therapy to address the deficits listed in the problem list box on initial  evaluation, provide pt/family education and to maximize pt's level of independence in the home and community environment.     Pt's spiritual, cultural and educational needs considered and pt agreeable to plan of care and goals.     Anticipated barriers to physical therapy: chronic nature     Goals:   STG 3 weeks  1.  Pt to be independent with HEP for increased functional mobility and pain control. Progressing 4/26/23  2.  Pt to increase AROM at B knee flexion extension 0-100 degs for increased functional mobility and transfers. Progressing.   3.  Pt to decrease pain to less than 2/10 after session for increased functional mobility. MET 4/26/23     Long Term Goals: 8 weeks   1.  Pt to be independent with pain management strategies and verbalize 2 strategies for increased functional mobility and pain control.  2.  Pt to increase AROM at B knee flexion extension 0-110 degs for increased functional mobility and transfers.  3.  Pt to decrease pain to less than 1/10 after session for increased functional mobility.  4.  Pt to increased strength in B knees flexion and extension to 5/5 for increased mobility.  5.  Pt to increased tolerance to 25 min continuous walking to return to prior recreational activity level.     PLAN     Cont with KZA Huber, PT

## 2023-06-08 ENCOUNTER — CLINICAL SUPPORT (OUTPATIENT)
Dept: REHABILITATION | Facility: HOSPITAL | Age: 58
End: 2023-06-08
Payer: MEDICARE

## 2023-06-08 DIAGNOSIS — R29.898 WEAKNESS OF BOTH LOWER EXTREMITIES: Primary | ICD-10-CM

## 2023-06-08 DIAGNOSIS — M25.661 DECREASED RANGE OF MOTION OF BOTH KNEES: ICD-10-CM

## 2023-06-08 DIAGNOSIS — M25.662 DECREASED RANGE OF MOTION OF BOTH KNEES: ICD-10-CM

## 2023-06-08 PROCEDURE — 97110 THERAPEUTIC EXERCISES: CPT | Mod: PO,CQ

## 2023-06-08 PROCEDURE — 97140 MANUAL THERAPY 1/> REGIONS: CPT | Mod: PO,CQ

## 2023-06-08 NOTE — PROGRESS NOTES
[facility]   Physical Therapy Treatment Note     Name: Martha SCHRADER [facility] Number: [id number]    Therapy Diagnosis:   Encounter Diagnoses   Name Primary?    Weakness of both lower extremities Yes    Decreased range of motion of both knees      Physician: Enzo Sales MD    Visit Date: 6/8/2023    Physician Orders: PT eval and treat  Medical Diagnosis: M15.9 (ICD-10-CM) - Primary osteoarthritis involving multiple joints  Evaluation Date: 4/18/23  Authorization Period Expiration: 6/31/23  Plan of Care Certification Period: 6/31/23  Progress Note Due: 7/6/23    Visit # / Visits authorized: 7/11   FOTO: 3/ 3 - last completed 6/6/23 and discharge foto  PTA Visit #: 1/5     Precautions: Standard    Time In: 9:30 am  Time Out: 10:15 am  Total Billable Time: 45 minutes MT 1, TE 2 + mHP x 0 min after session.       SUBJECTIVE     Pt reports: knees feel tight. The R hurts more than the L.  She was compliant with home exercise program.  Response to previous treatment: good relief with session for a day  Functional change: no changes at this time    Pain: 7/10 R - 5/10 L  Location: bilateral knee    OBJECTIVE       TREATMENT     Martha received the treatments listed below:    Bold=performed  Martha received therapeutic exercises to develop strength, endurance, ROM, posture, and core stabilization for 35 minutes including:  Recumbent bike 5 mins  Pass prolonged into knee extension with 4# on knee 2 x 3 min ins session  Nustep x6 min level 2  Quad set 2 x 10 hold 3 secs with towel roll under knee  Bridges with add ball 2 x 10  Sidelying clams BTB 2 x 10  Bridges with abd BTB 3 x 10  SLR 2 x 10 slow pankaj B  SAQ 2x10 B 4#  HS stretch supine 2 x 30 secs  HS stretch sitting 3 x 30 secs  Hip add/frog stretch 3 x 30 secs  Willi test position hip flexor stretch x1min B  HS curl with Blue theraband in sitting and supine x20 each position  LAQ 4# 2x10 B    SKTC 3 x 30 secs    Martha received the following  manual therapy techniques: Manual traction, Myofacial release, and Soft tissue Mobilization were applied to the: adductors, distal quads vastus lateralis for 10 minutes, including:  Long axis traction  Short axis traction  PA AP at joint line into TKE with towel roll under knee     Martha participated in dynamic functional therapeutic activities to improve functional performance for 0 minutes, including:  Sit to stands 2x10  Leg press on shuttle 2 blk bands 2x10     Martha participated in gait training to improve functional mobility and safety for 0  minutes, including:     Martha received hot pack for 0 minutes to thigh.     Martha received cold pack for 0 minutes to knee.      PATIENT EDUCATION AND HOME EXERCISES     Home Exercises Provided and Patient Education Provided     Education provided:   - HEP, pain management    Written Home Exercises Provided: Patient instructed to cont prior HEP. Exercises were reviewed and Martha was able to demonstrate them prior to the end of the session.  Martha demonstrated good  understanding of the education provided. See EMR under Patient Instructions for exercises provided during therapy sessions    ASSESSMENT     Martha presents to treatment session with reports of B knee tightness and pain. She remains with good tolerance to exercises and is able to achieve PROM into full extension but remains with difficulty with AROM. Encouraged continued compliance at home and importance of achieving full knee extension for proper knee mechanics which she verbalized good understanding of. Continue to monitor and progress as able.    Martha Is progressing well towards her goals.   Pt prognosis is Good.     Pt will continue to benefit from skilled outpatient physical therapy to address the deficits listed in the problem list box on initial evaluation, provide pt/family education and to maximize pt's level of independence in the home and community environment.     Pt's spiritual, cultural and  educational needs considered and pt agreeable to plan of care and goals.     Anticipated barriers to physical therapy: chronic nature     Goals:   STG 3 weeks  1.  Pt to be independent with HEP for increased functional mobility and pain control. Progressing 4/26/23  2.  Pt to increase AROM at B knee flexion extension 0-100 degs for increased functional mobility and transfers. Progressing.   3.  Pt to decrease pain to less than 2/10 after session for increased functional mobility. MET 4/26/23     Long Term Goals: 8 weeks   1.  Pt to be independent with pain management strategies and verbalize 2 strategies for increased functional mobility and pain control.  2.  Pt to increase AROM at B knee flexion extension 0-110 degs for increased functional mobility and transfers.  3.  Pt to decrease pain to less than 1/10 after session for increased functional mobility.  4.  Pt to increased strength in B knees flexion and extension to 5/5 for increased mobility.  5.  Pt to increased tolerance to 25 min continuous walking to return to prior recreational activity level.     PLAN     Cont with POC    Domenica Byrne, PTA

## 2023-06-15 ENCOUNTER — CLINICAL SUPPORT (OUTPATIENT)
Dept: REHABILITATION | Facility: HOSPITAL | Age: 58
End: 2023-06-15
Payer: MEDICARE

## 2023-06-15 DIAGNOSIS — M25.662 DECREASED RANGE OF MOTION OF BOTH KNEES: ICD-10-CM

## 2023-06-15 DIAGNOSIS — M25.661 DECREASED RANGE OF MOTION OF BOTH KNEES: ICD-10-CM

## 2023-06-15 DIAGNOSIS — R29.898 WEAKNESS OF BOTH LOWER EXTREMITIES: Primary | ICD-10-CM

## 2023-06-15 PROCEDURE — 97110 THERAPEUTIC EXERCISES: CPT | Mod: PO

## 2023-06-15 PROCEDURE — 97140 MANUAL THERAPY 1/> REGIONS: CPT | Mod: PO

## 2023-06-15 NOTE — PROGRESS NOTES
"[facility]   Physical Therapy Treatment Note     Name: Martha SCHRADER [facility] Number: [id number]    Therapy Diagnosis:   Encounter Diagnoses   Name Primary?    Weakness of both lower extremities Yes    Decreased range of motion of both knees      Physician: Enzo Sales MD    Visit Date: 6/15/2023    Physician Orders: PT eval and treat  Medical Diagnosis: M15.9 (ICD-10-CM) - Primary osteoarthritis involving multiple joints  Evaluation Date: 4/18/23  Authorization Period Expiration: 6/31/23  Plan of Care Certification Period: 6/31/23  Progress Note Due: 7/6/23    Visit # / Visits authorized: 7/11   FOTO: 3/ 3 - last completed 6/6/23 and discharge foto  PTA Visit #: 1/5     Precautions: Standard    Time In: 9:30 am  Time Out: 10:15 am  Total Billable Time: 45 minutes MT 1, TE 2 + mHP x 0 min after session.       SUBJECTIVE     Pt reports: knees feel tight. The R hurts more than the L and reported 7/10 pain but said "it's mild"  She was compliant with home exercise program.  Response to previous treatment: good relief with session for a day  Functional change: no changes at this time    Pain: 3/10 R - 0/10 L  pain scale after education with scoring. And 0/10 bilaterally post session.   Location: bilateral knee    OBJECTIVE       TREATMENT     Martha received the treatments listed below:    Bold=performed  Martha received therapeutic exercises to develop strength, endurance, ROM, posture, and core stabilization for 35 minutes including:  Recumbent bike 5 mins  Pass prolonged into knee extension with 4# on knee 2 x 3 min ins session  Nustep x6 min level 2  Quad set 2 x 10 hold 3 secs with towel roll under knee  Bridges with add ball 2 x 10  Sidelying clams BTB 2 x 10  Bridges with abd BTB 3 x 10  SLR 2 x 10 slow pankaj B  Sit to stand with eccentric control 2 x 10  Side step with BTB 3 x 20'  SAQ 2x10 B 4#  HS stretch supine 2 x 30 secs  HS stretch sitting 3 x 30 secs  Hip add/frog stretch 3 " x 30 secs B  Trunk rotation stretch 3 x 30 secs B  Willi test position hip flexor stretch x1min B  HS curl with Blue theraband in sitting and supine x20 each position  LAQ 4# 2x10 B    SKTC 3 x 30 secs    Martha received the following manual therapy techniques: Manual traction, Myofacial release, and Soft tissue Mobilization were applied to the: adductors, distal quads vastus lateralis for 10 minutes, including:  Long axis traction  Short axis traction  PA AP at joint line into TKE with towel roll under knee     Martha participated in dynamic functional therapeutic activities to improve functional performance for 0 minutes, including:  Sit to stands 2x10  Leg press on shuttle 2 blk bands 2x10     Martha participated in gait training to improve functional mobility and safety for 0  minutes, including:     Martha received hot pack for 0 minutes to thigh.     Martha received cold pack for 0 minutes to knee.      PATIENT EDUCATION AND HOME EXERCISES     Home Exercises Provided and Patient Education Provided     Education provided:   - HEP, pain management    Written Home Exercises Provided: Patient instructed to cont prior HEP. Exercises were reviewed and Martha was able to demonstrate them prior to the end of the session.  Martha demonstrated good  understanding of the education provided. See EMR under Patient Instructions for exercises provided during therapy sessions    ASSESSMENT     Martha presents to treatment session with reports of B knee tightness and pain. She remains with good tolerance to exercises and is able to tolerate increased workload and standing exercises without issues and with increased overall activity at home.     Martha Is progressing well towards her goals.   Pt prognosis is Good.     Pt will continue to benefit from skilled outpatient physical therapy to address the deficits listed in the problem list box on initial evaluation, provide pt/family education and to maximize pt's level of independence in  the home and community environment.     Pt's spiritual, cultural and educational needs considered and pt agreeable to plan of care and goals.     Anticipated barriers to physical therapy: chronic nature     Goals:   STG 3 weeks  1.  Pt to be independent with HEP for increased functional mobility and pain control. Progressing 4/26/23  2.  Pt to increase AROM at B knee flexion extension 0-100 degs for increased functional mobility and transfers. MET 6/15/23  3.  Pt to decrease pain to less than 2/10 after session for increased functional mobility. MET 4/26/23     Long Term Goals: 8 weeks   1.  Pt to be independent with pain management strategies and verbalize 2 strategies for increased functional mobility and pain control.  2.  Pt to increase AROM at B knee flexion extension 0-110 degs for increased functional mobility and transfers.  3.  Pt to decrease pain to less than 1/10 after session for increased functional mobility. M ET 6/15/23  4.  Pt to increased strength in B knees flexion and extension to 5/5 for increased mobility.  5.  Pt to increased tolerance to 25 min continuous walking to return to prior recreational activity level.     PLAN     Cont with KAZ Huber, PT

## 2024-04-12 ENCOUNTER — TELEPHONE (OUTPATIENT)
Dept: PRIMARY CARE CLINIC | Facility: CLINIC | Age: 59
End: 2024-04-12
Payer: MEDICARE

## 2024-04-12 NOTE — TELEPHONE ENCOUNTER
Attempted to contact the patient to schedule her annual appointment. Could not leave a voicemail because the mailbox is not setup.

## 2024-04-12 NOTE — TELEPHONE ENCOUNTER
----- Message from Marlyn Márquez sent at 4/12/2024  2:48 PM CDT -----  Contact: Pt  [phone]  No blue slot available to schedule an appointment for the patient.  Patient is established with which PCP: Mónica Chicas  Reason for the visit: Annual   Would the patient like a call back, or a response through their [facility] portal? call

## 2024-04-30 ENCOUNTER — OFFICE VISIT (OUTPATIENT)
Dept: PRIMARY CARE CLINIC | Facility: CLINIC | Age: 59
End: 2024-04-30
Payer: MEDICARE

## 2024-04-30 ENCOUNTER — LAB VISIT (OUTPATIENT)
Dept: LAB | Facility: HOSPITAL | Age: 59
End: 2024-04-30
Attending: STUDENT IN AN ORGANIZED HEALTH CARE EDUCATION/TRAINING PROGRAM
Payer: MEDICARE

## 2024-04-30 VITALS
WEIGHT: 268.94 LBS | HEART RATE: 90 BPM | OXYGEN SATURATION: 98 % | DIASTOLIC BLOOD PRESSURE: 86 MMHG | TEMPERATURE: 98 F | BODY MASS INDEX: 45.91 KG/M2 | HEIGHT: 64 IN | SYSTOLIC BLOOD PRESSURE: 130 MMHG

## 2024-04-30 DIAGNOSIS — D50.8 IRON DEFICIENCY ANEMIA SECONDARY TO INADEQUATE DIETARY IRON INTAKE: ICD-10-CM

## 2024-04-30 DIAGNOSIS — E78.2 MIXED HYPERLIPIDEMIA: Primary | ICD-10-CM

## 2024-04-30 DIAGNOSIS — D53.9 NUTRITIONAL ANEMIA, UNSPECIFIED: ICD-10-CM

## 2024-04-30 DIAGNOSIS — E78.2 MIXED HYPERLIPIDEMIA: ICD-10-CM

## 2024-04-30 DIAGNOSIS — Z00.00 ENCOUNTER FOR PREVENTATIVE ADULT HEALTH CARE EXAMINATION: ICD-10-CM

## 2024-04-30 DIAGNOSIS — Z12.31 SCREENING MAMMOGRAM FOR BREAST CANCER: ICD-10-CM

## 2024-04-30 DIAGNOSIS — Z13.1 SCREENING FOR DIABETES MELLITUS: ICD-10-CM

## 2024-04-30 DIAGNOSIS — E66.01 CLASS 3 SEVERE OBESITY DUE TO EXCESS CALORIES WITH SERIOUS COMORBIDITY AND BODY MASS INDEX (BMI) OF 45.0 TO 49.9 IN ADULT: ICD-10-CM

## 2024-04-30 DIAGNOSIS — R79.9 ABNORMAL FINDING OF BLOOD CHEMISTRY, UNSPECIFIED: ICD-10-CM

## 2024-04-30 DIAGNOSIS — M15.9 PRIMARY OSTEOARTHRITIS INVOLVING MULTIPLE JOINTS: ICD-10-CM

## 2024-04-30 PROBLEM — E66.813 CLASS 3 SEVERE OBESITY DUE TO EXCESS CALORIES WITHOUT SERIOUS COMORBIDITY WITH BODY MASS INDEX (BMI) OF 40.0 TO 44.9 IN ADULT: Status: RESOLVED | Noted: 2020-08-26 | Resolved: 2024-04-30

## 2024-04-30 LAB
ALBUMIN SERPL BCP-MCNC: 3.7 G/DL (ref 3.5–5.2)
ALP SERPL-CCNC: 93 U/L (ref 55–135)
ALT SERPL W/O P-5'-P-CCNC: 16 U/L (ref 10–44)
ANION GAP SERPL CALC-SCNC: 8 MMOL/L (ref 8–16)
AST SERPL-CCNC: 16 U/L (ref 10–40)
BILIRUB SERPL-MCNC: 0.2 MG/DL (ref 0.1–1)
BUN SERPL-MCNC: 14 MG/DL (ref 6–20)
CALCIUM SERPL-MCNC: 9.7 MG/DL (ref 8.7–10.5)
CHLORIDE SERPL-SCNC: 107 MMOL/L (ref 95–110)
CHOLEST SERPL-MCNC: 247 MG/DL (ref 120–199)
CHOLEST/HDLC SERPL: 3.6 {RATIO} (ref 2–5)
CO2 SERPL-SCNC: 27 MMOL/L (ref 23–29)
CREAT SERPL-MCNC: 1.1 MG/DL (ref 0.5–1.4)
ERYTHROCYTE [DISTWIDTH] IN BLOOD BY AUTOMATED COUNT: 13.5 % (ref 11.5–14.5)
EST. GFR  (NO RACE VARIABLE): 59 ML/MIN/1.73 M^2
ESTIMATED AVG GLUCOSE: 103 MG/DL (ref 68–131)
FERRITIN SERPL-MCNC: 37 NG/ML (ref 20–300)
GLUCOSE SERPL-MCNC: 85 MG/DL (ref 70–110)
HBA1C MFR BLD: 5.2 % (ref 4–5.6)
HCT VFR BLD AUTO: 36.9 % (ref 37–48.5)
HDLC SERPL-MCNC: 69 MG/DL (ref 40–75)
HDLC SERPL: 27.9 % (ref 20–50)
HGB BLD-MCNC: 11.9 G/DL (ref 12–16)
LDLC SERPL CALC-MCNC: 151.8 MG/DL (ref 63–159)
MCH RBC QN AUTO: 30.6 PG (ref 27–31)
MCHC RBC AUTO-ENTMCNC: 32.2 G/DL (ref 32–36)
MCV RBC AUTO: 95 FL (ref 82–98)
NONHDLC SERPL-MCNC: 178 MG/DL
PLATELET # BLD AUTO: 298 K/UL (ref 150–450)
PMV BLD AUTO: 10.1 FL (ref 9.2–12.9)
POTASSIUM SERPL-SCNC: 4.6 MMOL/L (ref 3.5–5.1)
PROT SERPL-MCNC: 7.5 G/DL (ref 6–8.4)
RBC # BLD AUTO: 3.89 M/UL (ref 4–5.4)
SODIUM SERPL-SCNC: 142 MMOL/L (ref 136–145)
TRIGL SERPL-MCNC: 131 MG/DL (ref 30–150)
TSH SERPL DL<=0.005 MIU/L-ACNC: 0.64 UIU/ML (ref 0.4–4)
WBC # BLD AUTO: 5.57 K/UL (ref 3.9–12.7)

## 2024-04-30 PROCEDURE — 1159F MED LIST DOCD IN RCRD: CPT | Mod: CPTII,S$GLB,, | Performed by: STUDENT IN AN ORGANIZED HEALTH CARE EDUCATION/TRAINING PROGRAM

## 2024-04-30 PROCEDURE — 99396 PREV VISIT EST AGE 40-64: CPT | Mod: S$GLB,,, | Performed by: STUDENT IN AN ORGANIZED HEALTH CARE EDUCATION/TRAINING PROGRAM

## 2024-04-30 PROCEDURE — 36415 COLL VENOUS BLD VENIPUNCTURE: CPT | Mod: PN | Performed by: STUDENT IN AN ORGANIZED HEALTH CARE EDUCATION/TRAINING PROGRAM

## 2024-04-30 PROCEDURE — 3079F DIAST BP 80-89 MM HG: CPT | Mod: CPTII,S$GLB,, | Performed by: STUDENT IN AN ORGANIZED HEALTH CARE EDUCATION/TRAINING PROGRAM

## 2024-04-30 PROCEDURE — 3075F SYST BP GE 130 - 139MM HG: CPT | Mod: CPTII,S$GLB,, | Performed by: STUDENT IN AN ORGANIZED HEALTH CARE EDUCATION/TRAINING PROGRAM

## 2024-04-30 PROCEDURE — 80053 COMPREHEN METABOLIC PANEL: CPT | Performed by: STUDENT IN AN ORGANIZED HEALTH CARE EDUCATION/TRAINING PROGRAM

## 2024-04-30 PROCEDURE — 3008F BODY MASS INDEX DOCD: CPT | Mod: CPTII,S$GLB,, | Performed by: STUDENT IN AN ORGANIZED HEALTH CARE EDUCATION/TRAINING PROGRAM

## 2024-04-30 PROCEDURE — 82728 ASSAY OF FERRITIN: CPT | Performed by: STUDENT IN AN ORGANIZED HEALTH CARE EDUCATION/TRAINING PROGRAM

## 2024-04-30 PROCEDURE — 99999 PR PBB SHADOW E&M-EST. PATIENT-LVL III: CPT | Mod: PBBFAC,,, | Performed by: STUDENT IN AN ORGANIZED HEALTH CARE EDUCATION/TRAINING PROGRAM

## 2024-04-30 PROCEDURE — 84443 ASSAY THYROID STIM HORMONE: CPT | Performed by: STUDENT IN AN ORGANIZED HEALTH CARE EDUCATION/TRAINING PROGRAM

## 2024-04-30 PROCEDURE — 80061 LIPID PANEL: CPT | Performed by: STUDENT IN AN ORGANIZED HEALTH CARE EDUCATION/TRAINING PROGRAM

## 2024-04-30 PROCEDURE — 83036 HEMOGLOBIN GLYCOSYLATED A1C: CPT | Performed by: STUDENT IN AN ORGANIZED HEALTH CARE EDUCATION/TRAINING PROGRAM

## 2024-04-30 PROCEDURE — 85027 COMPLETE CBC AUTOMATED: CPT | Performed by: STUDENT IN AN ORGANIZED HEALTH CARE EDUCATION/TRAINING PROGRAM

## 2024-04-30 RX ORDER — IBUPROFEN 800 MG/1
800 TABLET ORAL 3 TIMES DAILY
COMMUNITY

## 2024-04-30 NOTE — PROGRESS NOTES
Primary Care  Annual Office Visit - In Person  4/30/2024          Patient is a 58 y.o.   Martha Zarate  has a past medical history of Obesity.    Patient h presenting for annual     She has chronic knee pain b/l. One year ago she attended several sessions of PT but discontinued       Active Medications  Current Outpatient Medications   Medication Instructions    ibuprofen (ADVIL,MOTRIN) 800 mg, Oral, 3 times daily    multivitamin capsule 1 capsule, Oral, Daily       Annual Review of Preventative Care    Health Maintenance Due   Topic Date Due    High Dose Statin  Never done    COVID-19 Vaccine (5 - 2023-24 season) 09/01/2023    Mammogram  05/30/2024     Diabetes Screening:    Lab Results   Component Value Date    HGBA1C 5.4 03/29/2023    HGBA1C 5.0 03/31/2022    HGBA1C 5.0 03/09/2021     BP Readings from Last 3 Encounters:   04/30/24 130/86   03/29/23 120/76   03/28/22 120/74     Wt Readings from Last 3 Encounters:   04/30/24 1347 122 kg (268 lb 15.4 oz)   03/29/23 0958 120.6 kg (265 lb 14 oz)   03/28/22 1019 118.2 kg (260 lb 9.3 oz)     Colon Cancer Screening: Next Due 2026  Mammo: Next Due 2024    Physical Exam  Vitals reviewed.   Constitutional:       General: She is not in acute distress.  Eyes:      Pupils: Pupils are equal, round, and reactive to light.   Cardiovascular:      Rate and Rhythm: Normal rate and regular rhythm.      Pulses: Normal pulses.      Heart sounds: Normal heart sounds.   Pulmonary:      Effort: Pulmonary effort is normal.      Breath sounds: Normal breath sounds.   Abdominal:      General: Abdomen is flat. Bowel sounds are normal.      Palpations: Abdomen is soft.   Musculoskeletal:      Right lower leg: No edema.      Left lower leg: No edema.             Assessment and Plan     1. Mixed hyperlipidemia  -     Lipid Panel; Future; Expected date: 04/30/2024    2. Screening for diabetes mellitus  -     Hemoglobin A1C; Future; Expected date: 04/30/2024    3. Screening mammogram for breast  cancer  -     Mammo Digital Screening Bilat; Future; Expected date: 04/30/2024    4. Iron deficiency anemia secondary to inadequate dietary iron intake  -     CBC Without Differential; Future; Expected date: 04/30/2024  -     FERRITIN; Future; Expected date: 04/30/2024    5. Encounter for preventative adult health care examination  -     Comprehensive Metabolic Panel; Future; Expected date: 04/30/2024  -     TSH; Future; Expected date: 04/30/2024    6. Primary osteoarthritis involving multiple joints  -     Ambulatory referral/consult to Orthopedics; Future; Expected date: 05/07/2024    7. Abnormal finding of blood chemistry, unspecified  -     Hemoglobin A1C; Future; Expected date: 04/30/2024    8. Nutritional anemia, unspecified  -     TSH; Future; Expected date: 04/30/2024                                     Upcoming Scheduled Appointments and Follow Up:    No future appointments.    Follow Up [facility] (with who? when?): No follow-ups on file.        Extended Emergency Contact Information  Primary Emergency Contact: Bee Zarate   [place]  Mobile Phone: [phone]  Relation: Spouse      Enzo Sales MD   Internal Medicine  4/30/2024 - 2:33 PM    I spent a total of 30 minutes on the day of the visit.This includes face to face time and non-face to face time preparing to see the patient (eg, review of tests), obtaining and/or reviewing separately obtained history, documenting clinical information in the electronic or other health record, independently interpreting results and communicating results to the patient/family/caregiver, or care coordinator.    While patients have the right to access their medical record, it is essential to recognize that progress notes primarily serve as a means of communication among healthcare professionals.

## 2024-05-01 ENCOUNTER — PATIENT MESSAGE (OUTPATIENT)
Dept: PRIMARY CARE CLINIC | Facility: CLINIC | Age: 59
End: 2024-05-01
Payer: MEDICARE

## 2024-05-01 ENCOUNTER — TELEPHONE (OUTPATIENT)
Dept: SPORTS MEDICINE | Facility: CLINIC | Age: 59
End: 2024-05-01
Payer: MEDICARE

## 2024-05-01 DIAGNOSIS — M25.561 RIGHT KNEE PAIN, UNSPECIFIED CHRONICITY: Primary | ICD-10-CM

## 2024-05-01 NOTE — TELEPHONE ENCOUNTER
Spoke to the patient in regard to her appointment on 5/2 with Sara Mayes PA-C. While on the call I confirmed why the patient needed to be seen. She reports bilateral knee pain. She denies her pain/injury is due to a car accident, worker's comp, or slip and fall. She was informed that her provider ordered x-ray, which is scheduled at 9am on 5/2. She was informed that her 9:30 am office with Sara Mayes PA-C is to follow immediately after.The patient expressed she verbally understood.

## 2024-05-02 ENCOUNTER — OFFICE VISIT (OUTPATIENT)
Dept: SPORTS MEDICINE | Facility: CLINIC | Age: 59
End: 2024-05-02
Payer: MEDICARE

## 2024-05-02 ENCOUNTER — HOSPITAL ENCOUNTER (OUTPATIENT)
Dept: RADIOLOGY | Facility: HOSPITAL | Age: 59
Discharge: HOME | End: 2024-05-02
Attending: PHYSICIAN ASSISTANT
Payer: MEDICARE

## 2024-05-02 VITALS
WEIGHT: 267.88 LBS | DIASTOLIC BLOOD PRESSURE: 84 MMHG | SYSTOLIC BLOOD PRESSURE: 137 MMHG | BODY MASS INDEX: 45.73 KG/M2 | HEART RATE: 81 BPM | HEIGHT: 64 IN

## 2024-05-02 DIAGNOSIS — M17.12 PRIMARY OSTEOARTHRITIS OF LEFT KNEE: Primary | ICD-10-CM

## 2024-05-02 DIAGNOSIS — M25.561 RIGHT KNEE PAIN, UNSPECIFIED CHRONICITY: ICD-10-CM

## 2024-05-02 DIAGNOSIS — M17.11 PRIMARY OSTEOARTHRITIS OF RIGHT KNEE: ICD-10-CM

## 2024-05-02 DIAGNOSIS — E66.01 CLASS 3 SEVERE OBESITY WITH BODY MASS INDEX (BMI) OF 45.0 TO 49.9 IN ADULT, UNSPECIFIED OBESITY TYPE, UNSPECIFIED WHETHER SERIOUS COMORBIDITY PRESENT: ICD-10-CM

## 2024-05-02 PROCEDURE — 73564 X-RAY EXAM KNEE 4 OR MORE: CPT | Mod: TC,50,PN

## 2024-05-02 PROCEDURE — 99204 OFFICE O/P NEW MOD 45 MIN: CPT | Mod: S$GLB,,, | Performed by: PHYSICIAN ASSISTANT

## 2024-05-02 PROCEDURE — 3044F HG A1C LEVEL LT 7.0%: CPT | Mod: CPTII,S$GLB,, | Performed by: PHYSICIAN ASSISTANT

## 2024-05-02 PROCEDURE — 1159F MED LIST DOCD IN RCRD: CPT | Mod: CPTII,S$GLB,, | Performed by: PHYSICIAN ASSISTANT

## 2024-05-02 PROCEDURE — 1160F RVW MEDS BY RX/DR IN RCRD: CPT | Mod: CPTII,S$GLB,, | Performed by: PHYSICIAN ASSISTANT

## 2024-05-02 PROCEDURE — 3008F BODY MASS INDEX DOCD: CPT | Mod: CPTII,S$GLB,, | Performed by: PHYSICIAN ASSISTANT

## 2024-05-02 PROCEDURE — 73564 X-RAY EXAM KNEE 4 OR MORE: CPT | Mod: 26,50,, | Performed by: RADIOLOGY

## 2024-05-02 PROCEDURE — 3075F SYST BP GE 130 - 139MM HG: CPT | Mod: CPTII,S$GLB,, | Performed by: PHYSICIAN ASSISTANT

## 2024-05-02 PROCEDURE — 3079F DIAST BP 80-89 MM HG: CPT | Mod: CPTII,S$GLB,, | Performed by: PHYSICIAN ASSISTANT

## 2024-05-02 PROCEDURE — 99999 PR PBB SHADOW E&M-EST. PATIENT-LVL V: CPT | Mod: PBBFAC,,, | Performed by: PHYSICIAN ASSISTANT

## 2024-05-02 NOTE — PATIENT INSTRUCTIONS
DESCRIPTION  Synvisc-One (hylan G-F 20) is an elastoviscous high molecular weight fluid containing hylan A and hylan B polymers produced from chicken day. Hylans are derivatives of hyaluronan (sodium hyaluronate). Hylan G-F 20 is unique in that the hyaluronan is chemically cross linked. Hyaluronan is a long-chain polymer containing repeating disaccharide units of Cv-kcvqcmdaloa-J-acetylglucosamine.     INDICATIONS FOR USE  Synvisc-One is indicated for the treatment of pain in osteoarthritis (OA) of the knee in patients who have failed to respond adequately to conservative nonpharmacologic therapy and simple analgesics, e.g., acetaminophen.     Who is a candidate for Synvisc-One  Patients with knee osteoarthritis, who have tried diet, exercise and over-the-counter pain medication but still have pain, should talk to their doctor to see if Synvisc-One is right for them.     How Synvisc-One is administered  Synvisc-One is a single injection. It's a simple, in-office procedure that only takes a few minutes.     What you can expect following a Synvisc-One knee injection Synvisc-One can provide up to six months of osteoarthritis knee pain relief. Everyone responds differently, but in a medical study* patients experienced relief starting one month after the injection.     After the injection, you can resume normal day-to-day activities, but you should avoid any strenuous activities for about 48 hours.     Contraindication  Do not administer to patients with known hypersensitivity (allergy) to hyaluronan (sodium hyaluronate) preparations.   Do not inject Synvisc-One in the knees of patients having knee joint infections or skin diseases or infections in the area of theinjection site.    What are possible side effects?  Synvisc may occur short-term pain, swelling at the injection site and / or the appearance of synovial exudate after injection. In some cases, exudation may be significant and cause more prolonged pain.      Important Safety Information  Before trying Synvisc-One or SYNVISC, tell your doctor if you are allergic to products from birds - such as feathers, eggs or poultry - or if your leg is swollen or infected. Synvisc-One and SYNVISC are only for injection into the knee, performed by a doctor or other qualified health care professional. Synvisc-One and SYNVISC have not been tested to show pain relief in joints other than the knee. Talk to your doctor before resuming strenuous weight-bearing activities after treatment. Synvisc-One and SYNVISC have not been tested in children, pregnant women or women who are nursing. You should tell your doctor if you think you are pregnant or if you are nursing a child. The side effects most commonly seen when Synvisc-One or SYNVISC is injected into the knee were pain, swelling and/or fluid buildup in or around the knee. Cases where the swelling is extensive or painful should be discussed with your doctor. Allergic reactions such as rash and hives have been reported rarely.

## 2024-05-02 NOTE — PROGRESS NOTES
Subjective:     Chief Complaint: Martha Zarate is a 58 y.o. female who had concerns including Pain of the Right Knee and Pain of the Left Knee.    Patient presents to clinic with bilateral knee pain x 2-3 years. Denies a specific LESA. R>L. Pain is located along the medial and taj-patellar aspect of her knees. Denies any mechanical symptoms or instability. She reports occasional effusions which is associated with increased activity. She has been taking NSAIDS, using voltaren gel, and Tylenol as needed for pain. Denies any previous history of surgery or injections into her knee. She reports having completed formal PT in the past with some relief. Pain in right knee is 7/10. Pain in left knee is 4/10. She is not currently working on weight loss. She is here today to discuss treatment options.     Pain  Associated symptoms include joint swelling. Pertinent negatives include no abdominal pain, chest pain, chills, congestion, coughing, fever, headaches, myalgias, nausea, numbness, rash, sore throat or vomiting.       Review of Systems   Constitutional: Negative. Negative for chills, fever, weight gain and weight loss.   HENT:  Negative for congestion and sore throat.    Eyes:  Negative for blurred vision and double vision.   Cardiovascular:  Negative for chest pain, leg swelling and palpitations.   Respiratory:  Negative for cough and shortness of breath.    Hematologic/Lymphatic: Does not bruise/bleed easily.   Skin:  Negative for itching, poor wound healing and rash.   Musculoskeletal:  Positive for joint pain and joint swelling. Negative for back pain, muscle weakness, myalgias and stiffness.   Gastrointestinal:  Negative for abdominal pain, constipation, diarrhea, nausea and vomiting.   Genitourinary: Negative.  Negative for frequency and hematuria.   Neurological:  Negative for dizziness, headaches, numbness, paresthesias and sensory change.   Psychiatric/Behavioral:  Negative for altered mental status and  depression. The patient is not nervous/anxious.    Allergic/Immunologic: Negative for hives.                 Objective:     General: Martha is well-developed, well-nourished, appears stated age, in no acute distress, alert and oriented to time, place and person.     General    Vitals reviewed.  Constitutional: She is oriented to person, place, and time. She appears well-developed and well-nourished. No distress.   HENT:   Head: Normocephalic and atraumatic.   Eyes: EOM are normal.   Cardiovascular:  Normal rate and regular rhythm.            Pulmonary/Chest: Effort normal.   Neurological: She is alert and oriented to person, place, and time. She has normal reflexes. No cranial nerve deficit. Coordination normal.   Psychiatric: She has a normal mood and affect. Her behavior is normal. Judgment and thought content normal.     General Musculoskeletal Exam   Gait: antalgic and abnormal       Right Knee Exam     Inspection   Erythema: absent  Scars: absent  Swelling: absent  Effusion: present  Deformity: absent  Bruising: absent    Tenderness   The patient is tender to palpation of the medial joint line and patella.    Crepitus   The patient has crepitus of the patella.    Range of Motion   Extension:  10 abnormal   Flexion:  90 abnormal     Tests   Meniscus   Ingrid:  Medial - negative Lateral - negative  Ligament Examination   Lachman: normal (-1 to 2mm)   PCL-Posterior Drawer: normal (0 to 2mm)     MCL - Valgus: normal (0 to 2mm)  LCL - Varus: normal  Pivot Shift: normal (Equal)  Reverse Pivot Shift: normal (Equal)  Posterolateral Corner: stable  Patella   Passive Patellar Tilt: neutral  Patellar Tracking: normal  Patellar Glide (quadrants): Lateral - 1   Medial - 2  Patellar Grind: positive    Other   Sensation: normal    Left Knee Exam     Inspection   Erythema: absent  Scars: absent  Swelling: absent  Effusion: present  Deformity: absent  Bruising: absent    Tenderness   The patient tender to palpation of the  medial joint line and patella.    Crepitus   The patient has crepitus of the patella.    Range of Motion   Extension:  5 abnormal   Flexion:  90 abnormal     Tests   Meniscus   Ingrid:  Medial - negative Lateral - negative  Stability   Lachman: normal (-1 to 2mm)   PCL-Posterior Drawer: normal (0 to 2mm)  MCL - Valgus: normal (0 to 2mm)  LCL - Varus: normal (0 to 2mm)  Pivot Shift: normal (Equal)  Reverse Pivot Shift: normal (Equal)  Posterolateral Corner: stable  Patella   Passive Patellar Tilt: neutral  Patellar Tracking: normal  Patellar Glide (Quadrants): Lateral - 1 Medial - 2  Patellar Grind: positive    Other   Sensation: normal    Muscle Strength   Right Lower Extremity   Hip Abduction: 5/5   Quadriceps:  5/5   Hamstrin/5   Left Lower Extremity   Hip Abduction: 5/5   Quadriceps:  5/5   Hamstrin/5     Reflexes     Left Side  Achilles:  2+  Quadriceps:  2+    Right Side   Achilles:  2+  Quadriceps:  2+    Vascular Exam     Right Pulses  Dorsalis Pedis:      2+  Posterior Tibial:      2+        Left Pulses  Dorsalis Pedis:      2+  Posterior Tibial:      2+        RADIOGRAPHS: 24  Bilateral knees:  Moderate tricompartment DJD changes particularly medial compartments and patellofemoral joints. Less prominent DJD changes tibia-fibular joints and remote posttraumatic hypertrophic change proximal right fibular shaft posterior cortex. Mild size left suprapatellar joint effusion and no effusion contralateral side.       Assessment:     Encounter Diagnoses   Name Primary?    Primary osteoarthritis of left knee Yes    Primary osteoarthritis of right knee     Class 3 severe obesity with body mass index (BMI) of 45.0 to 49.9 in adult, unspecified obesity type, unspecified whether serious comorbidity present         Plan:     We have discussed a variety of treatment options including medications, injections, physical therapy and other alternative treatments. I also explained the indications, risks and  benefits of surgery. Given the patients hx and examination today, I believe she would benefit from physical therapy, visco-supplementation, and weight loss. Pt agrees and would like to proceed with physical therapy, visco-supplementation, and weight loss.    Medical Necessity for viscosupplementation use: After thorough evaluation of the patient, I have determined that viscosupplementation treatment is medically necessary. The patient has painful degenerative joint disease (DJD) of the knee(s) with failure of conservative treatments including lifestyle modifications and rehabilitation exercises. Oral analgesics including NSAIDs have not adequately controlled the patient's symptoms. There is radiographic evidence of Kellgren-Griffin grade II (or greater) osteoarthritic (OA) changes, or if lack of radiographic evidence, there is arthroscopic or other evidence of chondrosis of the knee(s).     I made the decision to obtain old records of the patient including previous notes and imaging. I independently reviewed and interpreted lab results today as well as prior imaging. Reviewed with patient in detail.    1. OTC NSAIDs or Tylenol as needed.  2. Pre-authorization placed for bilateral Synvisc-One injections  3. Ambulatory referral to physical therapy for patellofemoral strengthening and conditioning. [facility]  4. Ice compress to the affected area 2-3x a day for 15-20 minutes as needed for pain management.  5. Patient understands that increased BMI can contribute to chronic knee pain. Currently Body mass index is 45.98 kg/m². She was given a BMI short term goal of 40 today.  6. Her insurance will not approve nutrition consult.  7. Referral to Bariatrics placed  8. RTC to see Sara Mayes PA-C in 2 weeks for bilateral knee Synvisc one injections.       All of the patient's questions were answered and the patient will contact us if they have any questions or concerns in the interim.           Patient  questionnaires may have been collected.

## 2024-05-03 ENCOUNTER — OFFICE VISIT (OUTPATIENT)
Dept: PRIMARY CARE CLINIC | Facility: CLINIC | Age: 59
End: 2024-05-03
Payer: MEDICARE

## 2024-05-03 ENCOUNTER — TELEPHONE (OUTPATIENT)
Dept: BARIATRICS | Facility: CLINIC | Age: 59
End: 2024-05-03
Payer: MEDICARE

## 2024-05-03 DIAGNOSIS — E78.2 MIXED HYPERLIPIDEMIA: ICD-10-CM

## 2024-05-03 DIAGNOSIS — D50.8 IRON DEFICIENCY ANEMIA SECONDARY TO INADEQUATE DIETARY IRON INTAKE: Primary | ICD-10-CM

## 2024-05-03 DIAGNOSIS — R94.4 DECREASED GFR: ICD-10-CM

## 2024-05-03 PROCEDURE — 3044F HG A1C LEVEL LT 7.0%: CPT | Mod: CPTII,95,, | Performed by: STUDENT IN AN ORGANIZED HEALTH CARE EDUCATION/TRAINING PROGRAM

## 2024-05-03 PROCEDURE — 99213 OFFICE O/P EST LOW 20 MIN: CPT | Mod: 95,,, | Performed by: STUDENT IN AN ORGANIZED HEALTH CARE EDUCATION/TRAINING PROGRAM

## 2024-05-03 RX ORDER — FERROUS SULFATE 325(65) MG
325 TABLET, DELAYED RELEASE (ENTERIC COATED) ORAL
Qty: 45 TABLET | Refills: 1 | Status: SHIPPED | OUTPATIENT
Start: 2024-05-03 | End: 2024-10-30

## 2024-05-03 NOTE — PATIENT INSTRUCTIONS
Avoid NSAIDs like Advil, ibuprofen, and Aleve  Avoid proton pump inhibitors like omeprazole (Prilosec), pantoprazole (Protonix), or esomeprazole (Nexium)  These medications can affect your kidneys    If you need to take something for pain use Tylenol  If you need to take something for heartburn use Tums or Pepcid

## 2024-05-03 NOTE — PROGRESS NOTES
Telehealth Visit    The patient location is: [place]   The chief complaint leading to consultation is: Lab follow up     Visit type: audiovisual    Face to Face time with patient: 5 minutes  10 minutes of total time spent on the encounter, which includes face to face time and non-face to face time preparing to see the patient (eg, review of tests), Obtaining and/or reviewing separately obtained history, Documenting clinical information in the electronic or other health record, Independently interpreting results (not separately reported) and communicating results to the patient/family/caregiver, or Care coordination (not separately reported).       HPI    Patient is a 58 y.o.   Martha Zarate  has a past medical history of Obesity.    Patient presenting for lab follow up             Active Medications:  Current Outpatient Medications   Medication Instructions    ferrous sulfate 325 mg, Oral, Every 48 hours    ibuprofen (ADVIL,MOTRIN) 800 mg, Oral, 3 times daily    multivitamin capsule 1 capsule, Oral, Daily    omega-3 fatty acids-fish oil 340-1,000 mg Cap 1 capsule, Oral, Daily       Physical Exam    General: Does not appear to be in acute distress    Assessment and Plan     1. Iron deficiency anemia secondary to inadequate dietary iron intake  -     ferrous sulfate 325 (65 FE) MG EC tablet; Take 1 tablet (325 mg total) by mouth every 48 hours.  Dispense: 45 tablet; Refill: 1    2. Mixed hyperlipidemia  Comments:  Counseled on diet changes  Orders:  -     omega-3 fatty acids-fish oil 340-1,000 mg Cap; Take 1 capsule by mouth once daily.  Dispense: 90 capsule; Refill: 3    3. Decreased GFR  Comments:  Counseled on avoiding use of NSAIDs and PPIs                 Upcoming Scheduled Appointments and Follow Up:    Future Appointments   Date Time Provider Department Center   5/16/2024  9:00 AM Sara Mayes PA-C [facility]   5/31/2024  9:00 AM Tari Garcia, PT VE OP [facility]   6/11/2024  9:30  SIV[facility]       Follow Up [facility] Care (with who? when?): Follow up in about 6 months (around 11/3/2024).        Extended Emergency Contact Information  Primary Emergency Contact: Bee Zarate   [place]  Mobile Phone: [phone]  Relation: Spouse      Enzo Sales MD   Internal Medicine  5/3/2024 - 11:46 AM        Each patient to whom he or she provides medical services by telemedicine is:  (1) informed of the relationship between the physician and patient and the respective role of any other health care provider with respect to management of the patient; and (2) notified that he or she may decline to receive medical services by telemedicine and may withdraw from such care at any time.    While patients have the right to access their medical record, it is essential to recognize that progress notes primarily serve as a means of communication among healthcare professionals.

## 2024-05-07 ENCOUNTER — PATIENT OUTREACH (OUTPATIENT)
Dept: ADMINISTRATIVE | Facility: HOSPITAL | Age: 59
End: 2024-05-07
Payer: MEDICARE

## 2024-05-07 NOTE — PROGRESS NOTES
Patient due for the following    High Dose Statin     COVID-19 Vaccine (5 - 2023-24 season)    Mammogram       Immunizations: reviewed and updated  Care Everywhere: triggered  Care Teams: up to date  Outreach: completed

## 2024-05-16 ENCOUNTER — OFFICE VISIT (OUTPATIENT)
Dept: SPORTS MEDICINE | Facility: CLINIC | Age: 59
End: 2024-05-16
Payer: MEDICARE

## 2024-05-16 VITALS
BODY MASS INDEX: 44.51 KG/M2 | WEIGHT: 260.69 LBS | DIASTOLIC BLOOD PRESSURE: 85 MMHG | HEART RATE: 101 BPM | HEIGHT: 64 IN | SYSTOLIC BLOOD PRESSURE: 148 MMHG

## 2024-05-16 DIAGNOSIS — M17.12 PRIMARY OSTEOARTHRITIS OF LEFT KNEE: ICD-10-CM

## 2024-05-16 DIAGNOSIS — M17.11 PRIMARY OSTEOARTHRITIS OF RIGHT KNEE: Primary | ICD-10-CM

## 2024-05-16 PROCEDURE — 99499 UNLISTED E&M SERVICE: CPT | Mod: S$GLB,,, | Performed by: PHYSICIAN ASSISTANT

## 2024-05-16 PROCEDURE — 99999 PR PBB SHADOW E&M-EST. PATIENT-LVL III: CPT | Mod: PBBFAC,,, | Performed by: PHYSICIAN ASSISTANT

## 2024-05-16 PROCEDURE — 20610 DRAIN/INJ JOINT/BURSA W/O US: CPT | Mod: 50,S$GLB,, | Performed by: PHYSICIAN ASSISTANT

## 2024-05-16 NOTE — PROGRESS NOTES
Patient is here for follow up of bilateral knee arthritis. Pt is requesting bilateral knee Synvisc one.  [facility] Has failed other conservative modalities including NSAIDS, activity modification, weight loss.    The prior shot was tolerated well.    PHYSICAL EXAMINATION:     General: The patient is alert and oriented x 3. Mood is pleasant.   Observation of ears, eyes and nose reveals no gross abnormalities. No   labored breathing observed.     No signs of infection or adverse reaction to knee.    PROCEDURE NOTE:  Injection Procedure bilateral knee  A time out was performed, including verification of patient ID, procedure, site and side, availability of information and equipment, review of safety issues, and agreement with consent, the procedure site was marked.    After time out was performed, the patient was prepped aseptically with povidone-iodine swabsticks. A diagnostic and therapeutic injection of 6cc SynviscOne was given under sterile technique using a 22g x 1.5 needle from the anterolateral aspect of the bilateral Knee Joint in the sitting position.      Martha Zarate had no adverse reactions to the medication. Pain decreased. She was instructed to apply ice to the joint for 20 minutes and avoid strenuous activities for 24-36 hours following the injection. She was warned of possible blood sugar and/or blood pressure changes during that time. Following that time, she can resume regular activities.    She was reminded to call the clinic immediately for any adverse side effects as explained in clinic today.      RTC in 3 months for follow up with virtual visit.  All questions were answered, pt will contact us for questions or concerns in the interim.

## 2024-05-29 ENCOUNTER — TELEPHONE (OUTPATIENT)
Dept: BARIATRICS | Facility: CLINIC | Age: 59
End: 2024-05-29
Payer: MEDICARE

## 2024-06-08 ENCOUNTER — PATIENT MESSAGE (OUTPATIENT)
Dept: PRIMARY CARE CLINIC | Facility: CLINIC | Age: 59
End: 2024-06-08
Payer: MEDICARE

## 2024-06-11 ENCOUNTER — HOSPITAL ENCOUNTER (OUTPATIENT)
Dept: RADIOLOGY | Facility: HOSPITAL | Age: 59
Discharge: HOME | End: 2024-06-11
Attending: STUDENT IN AN ORGANIZED HEALTH CARE EDUCATION/TRAINING PROGRAM
Payer: MEDICARE

## 2024-06-11 DIAGNOSIS — Z12.31 SCREENING MAMMOGRAM FOR BREAST CANCER: ICD-10-CM

## 2024-06-11 PROCEDURE — 77067 SCR MAMMO BI INCL CAD: CPT | Mod: 26,,, | Performed by: RADIOLOGY

## 2024-06-11 PROCEDURE — 77063 BREAST TOMOSYNTHESIS BI: CPT | Mod: 26,,, | Performed by: RADIOLOGY

## 2024-06-11 PROCEDURE — 77067 SCR MAMMO BI INCL CAD: CPT | Mod: TC

## 2024-06-14 ENCOUNTER — OFFICE VISIT (OUTPATIENT)
Dept: PRIMARY CARE CLINIC | Facility: CLINIC | Age: 59
End: 2024-06-14
Payer: MEDICARE

## 2024-06-14 DIAGNOSIS — F41.9 ANXIETY: Primary | ICD-10-CM

## 2024-06-14 PROCEDURE — 3044F HG A1C LEVEL LT 7.0%: CPT | Mod: CPTII,95,, | Performed by: STUDENT IN AN ORGANIZED HEALTH CARE EDUCATION/TRAINING PROGRAM

## 2024-06-14 PROCEDURE — 99214 OFFICE O/P EST MOD 30 MIN: CPT | Mod: 95,,, | Performed by: STUDENT IN AN ORGANIZED HEALTH CARE EDUCATION/TRAINING PROGRAM

## 2024-06-14 NOTE — LETTER
June 14, 2024    Martha Zaraet  [address]  [place] [address]             [facility] - Primary Care  Primary Care  [address]  [place] [address]  Phone: [phone]  Fax: [phone]   June 14, 2024     Patient: Martha Zartae   YOB: 1965   Date of Visit: 6/14/2024       To Whom it May Concern:    Martha Zarate is under my care for anxiety.    I can confirm that an emotional support animal would be beneficial to her mental osman and well being. It would help to alleviate symptoms related to her condition.    If you have any questions or require further information please do not hesitate to contact me.           Sincerely,         Enzo Sales MD

## 2024-06-14 NOTE — PROGRESS NOTES
Telehealth Visit    The patient location is: [place]   The chief complaint leading to consultation is:  Anxiety     Visit type: audiovisual      Face to Face time with patient: 5 minutes  10 minutes of total time spent on the encounter, which includes face to face time and non-face to face time preparing to see the patient (eg, review of tests), Obtaining and/or reviewing separately obtained history, Documenting clinical information in the electronic or other health record, Independently interpreting results (not separately reported) and communicating results to the patient/family/caregiver, or Care coordination (not separately reported).       HPI    Patient is a 58 y.o.   Martha Zarate  has a past medical history of Obesity.    Patient presenting to request letter for emotional support animal while traveling to help alleviate anxiety      Active Medications:  Current Outpatient Medications   Medication Instructions    ferrous sulfate 325 mg, Oral, Every 48 hours    ibuprofen (ADVIL,MOTRIN) 800 mg, Oral, 3 times daily    multivitamin capsule 1 capsule, Oral, Daily    omega-3 fatty acids-fish oil 340-1,000 mg Cap 1 capsule, Oral, Daily       Physical Exam    General: Does not appear to be in acute distress    Assessment and Plan     1. Anxiety  Comments:  Letter provided to patient for emotional support animal                 Upcoming Scheduled Appointments and Follow Up:    Future Appointments   Date Time Provider Department Center   6/20/2024 10:30 AM Rasheeda Diane MD [facility]   8/15/2024  8:00 AM Sara Mayes PA-C [facility]       Follow Up [facility] Care (with who? when?): No follow-ups on file.        Extended Emergency Contact Information  Primary Emergency Contact: Bee Zarate   [place]  Mobile Phone: [phone]  Relation: Spouse      Enzo Sales MD   Internal Medicine  6/14/2024 - 2:27 PM        Each patient to whom he or she provides  medical services by telemedicine is:  (1) informed of the relationship between the physician and patient and the respective role of any other health care provider with respect to management of the patient; and (2) notified that he or she may decline to receive medical services by telemedicine and may withdraw from such care at any time.    While patients have the right to access their medical record, it is essential to recognize that progress notes primarily serve as a means of communication among healthcare professionals.

## 2024-06-20 ENCOUNTER — OFFICE VISIT (OUTPATIENT)
Dept: BARIATRICS | Facility: CLINIC | Age: 59
End: 2024-06-20
Payer: MEDICARE

## 2024-06-20 VITALS
HEIGHT: 63 IN | OXYGEN SATURATION: 98 % | SYSTOLIC BLOOD PRESSURE: 140 MMHG | BODY MASS INDEX: 46.18 KG/M2 | HEART RATE: 85 BPM | WEIGHT: 260.63 LBS | DIASTOLIC BLOOD PRESSURE: 82 MMHG

## 2024-06-20 DIAGNOSIS — Z71.3 ENCOUNTER FOR WEIGHT LOSS COUNSELING: ICD-10-CM

## 2024-06-20 DIAGNOSIS — E66.01 CLASS 3 SEVERE OBESITY DUE TO EXCESS CALORIES WITHOUT SERIOUS COMORBIDITY WITH BODY MASS INDEX (BMI) OF 45.0 TO 49.9 IN ADULT: Primary | ICD-10-CM

## 2024-06-20 PROCEDURE — 1159F MED LIST DOCD IN RCRD: CPT | Mod: CPTII,S$GLB,, | Performed by: STUDENT IN AN ORGANIZED HEALTH CARE EDUCATION/TRAINING PROGRAM

## 2024-06-20 PROCEDURE — 3079F DIAST BP 80-89 MM HG: CPT | Mod: CPTII,S$GLB,, | Performed by: STUDENT IN AN ORGANIZED HEALTH CARE EDUCATION/TRAINING PROGRAM

## 2024-06-20 PROCEDURE — 99204 OFFICE O/P NEW MOD 45 MIN: CPT | Mod: S$GLB,,, | Performed by: STUDENT IN AN ORGANIZED HEALTH CARE EDUCATION/TRAINING PROGRAM

## 2024-06-20 PROCEDURE — 3077F SYST BP >= 140 MM HG: CPT | Mod: CPTII,S$GLB,, | Performed by: STUDENT IN AN ORGANIZED HEALTH CARE EDUCATION/TRAINING PROGRAM

## 2024-06-20 PROCEDURE — 3008F BODY MASS INDEX DOCD: CPT | Mod: CPTII,S$GLB,, | Performed by: STUDENT IN AN ORGANIZED HEALTH CARE EDUCATION/TRAINING PROGRAM

## 2024-06-20 PROCEDURE — 1160F RVW MEDS BY RX/DR IN RCRD: CPT | Mod: CPTII,S$GLB,, | Performed by: STUDENT IN AN ORGANIZED HEALTH CARE EDUCATION/TRAINING PROGRAM

## 2024-06-20 PROCEDURE — 3044F HG A1C LEVEL LT 7.0%: CPT | Mod: CPTII,S$GLB,, | Performed by: STUDENT IN AN ORGANIZED HEALTH CARE EDUCATION/TRAINING PROGRAM

## 2024-06-20 PROCEDURE — 99999 PR PBB SHADOW E&M-EST. PATIENT-LVL IV: CPT | Mod: PBBFAC,,, | Performed by: STUDENT IN AN ORGANIZED HEALTH CARE EDUCATION/TRAINING PROGRAM

## 2024-06-20 RX ORDER — TOPIRAMATE 25 MG/1
25 TABLET ORAL 2 TIMES DAILY
Qty: 180 TABLET | Refills: 0 | Status: SHIPPED | OUTPATIENT
Start: 2024-06-20 | End: 2024-09-18

## 2024-06-20 NOTE — PROGRESS NOTES
Subjective     Patient ID: Martha Zarate is a 58 y.o. female.    Chief Complaint: Obesity and Consult    Patient presents for treatment of obesity.     Co-morbidities     Negative for thyroid cancer  Negative for kidney stones       History of Weight Loss Efforts  No prior use of weight loss medications    Current Physical Activity  Walks 20 minutes 3x/week    Current Eating Habits  Breakfast - eggs and pancakes  Lunch - salad  Dinner - sal[place] steak and mashed potatoes  Snacks - cookies  Beverages - diet coke, water    Medical Weight Loss  6/20/2024: 260.6 lbs, BMI 46.2, BFP 53.3%, .7 lbs, SMM 67.5 lbs, BMR 1563 kcal      Review of Systems   Constitutional:  Negative for chills and fever.   Respiratory:  Negative for shortness of breath.    Cardiovascular:  Negative for chest pain and palpitations.   Gastrointestinal:  Negative for abdominal pain, nausea and vomiting.   Musculoskeletal:  Positive for arthralgias.   Neurological:  Negative for dizziness and light-headedness.   Psychiatric/Behavioral:  The patient is not nervous/anxious.           Objective    Latest Reference Range & Units 03/29/23 10:40 04/30/24 14:32   WBC 3.90 - 12.70 K/uL 4.43 5.57   RBC 4.00 - 5.40 M/uL 3.96 (L) 3.89 (L)   Hemoglobin 12.0 - 16.0 g/dL 11.5 (L) 11.9 (L)   Hematocrit 37.0 - 48.5 % 37.3 36.9 (L)   MCV 82 - 98 fL 94 95   MCH 27.0 - 31.0 pg 29.0 30.6   MCHC 32.0 - 36.0 g/dL 30.8 (L) 32.2   RDW 11.5 - 14.5 % 13.0 13.5   Platelet Count 150 - 450 K/uL 301 298   MPV 9.2 - 12.9 fL 9.8 10.1   Ferritin 20.0 - 300.0 ng/mL  37   Sodium 136 - 145 mmol/L  142   Potassium 3.5 - 5.1 mmol/L  4.6   Chloride 95 - 110 mmol/L  107   CO2 23 - 29 mmol/L  27   Anion Gap 8 - 16 mmol/L  8   BUN 6 - 20 mg/dL  14   Creatinine 0.5 - 1.4 mg/dL  1.1   eGFR >60 mL/min/1.73 m^2  59.0 !   Glucose 70 - 110 mg/dL  85   Calcium 8.7 - 10.5 mg/dL  9.7   ALP 55 - 135 U/L  93   PROTEIN TOTAL 6.0 - 8.4 g/dL  7.5   Albumin 3.5 - 5.2 g/dL  3.7   BILIRUBIN TOTAL 0.1 -  1.0 mg/dL  0.2   AST 10 - 40 U/L  16   ALT 10 - 44 U/L  16   Cholesterol Total 120 - 199 mg/dL 251 (H) 247 (H)   HDL 40 - 75 mg/dL 75 69   HDL/Cholesterol Ratio 20.0 - 50.0 % 29.9 27.9   Non-HDL Cholesterol mg/dL 176 178   Total Cholesterol/HDL Ratio 2.0 - 5.0  3.3 3.6   Triglycerides 30 - 150 mg/dL 87 131   LDL Cholesterol 63.0 - 159.0 mg/dL 158.6 151.8   Hemoglobin A1C External 4.0 - 5.6 % 5.4 5.2   Estimated Avg Glucose 68 - 131 mg/dL 108 103   TSH 0.400 - 4.000 uIU/mL  0.642   (L): Data is abnormally low  !: Data is abnormal  (H): Data is abnormally high    Vitals:    06/20/24 1023   BP: (!) 140/82   Pulse: 85       Physical Exam  Vitals reviewed.   Constitutional:       General: She is not in acute distress.     Appearance: Normal appearance. She is obese. She is not ill-appearing, toxic-appearing or diaphoretic.   HENT:      Head: Normocephalic and atraumatic.   Cardiovascular:      Rate and Rhythm: Normal rate.   Pulmonary:      Effort: Pulmonary effort is normal. No respiratory distress.   Skin:     General: Skin is warm and dry.   Neurological:      Mental Status: She is alert and oriented to person, place, and time.            Assessment and Plan     1. Class 3 severe obesity due to excess calories without serious comorbidity with body mass index (BMI) of 45.0 to 49.9 in adult  -     Ambulatory referral/consult to Bariatric/Obesity Medicine  -     topiramate (TOPAMAX) 25 MG tablet; Take 1 tablet (25 mg total) by mouth 2 (two) times daily.  Dispense: 180 tablet; Refill: 0    2. Encounter for weight loss counseling        - Log all food and beverage intake with a daily calorie goal of 1200 calories per day    - Moderate intensity aerobic exercise for 150 minutes per week

## 2024-06-20 NOTE — PATIENT INSTRUCTIONS
Topiramate is approved for migraine and seizure prevention. Weight loss is a common side effect that is well documented. Other potential side effects include a rash, vision changes, paresthesias, forgetfulness, fatigue, kidney stones, and upset stomach.             Copyright © 2011, [facility]. For more information about The Healthy Eating Plate, please see The Nutrition Source, Department of Nutrition, [employer], www.thenutritionsource.org, and [employer], www.health.[employer].edu.      Meal Planning & Grocery Shopping    Meal planning builds the foundation for healthy eating. When you have structured ideas for healthy meals and foods available at home to prepare those meals, weight control becomes easier.  If only healthy foods are available at home, then you will be much more likely to eat healthy foods. And you will be less likely to go to a restaurant or  a fast food meal, which tend to be unhealthy and higher in calories than meals prepared at home.      Take 5-10 minutes each week to plan meals for the next 7 days.  Make a grocery list based on the meal plan.    Grocery Shopping Tips:  Shop on a full stomach.  Schedule your shopping for times when you are most motivated and able to be disciplined about your purchases. For example, after a stressful day at work it may be difficult to make the healthiest choices. Shopping at other times, such as early in the morning or after dinner, may be easier.  Focus your shopping on the outside aisles of the store, which tend to contain more fresh foods and lower calorie foods. The inside aisles tend to have more processed foods.  Stick to your list. Avoid buying unhealthy items just because they are on sale.   Compare nutrition labels to check the number of calories and percentage of fat.      What to buy:    Vegetables  Fresh vegetables  Frozen vegetables with no sauce or added salt  Canned vegetables with no  sauce or added salt    Protein  Lean meats, such as chicken and turkey  Limit red meats, such as beef to no more than 1x/week  Limit processed meats, such as cold cuts, guthrie, sausage, and hot dogs. Look for brands that have no nitrites and are minimally processed. Consider turkey sausage or turkey guthrie.  Fish and Shellfish  Eggs  Dried beans  Canned beans (reduced sodium)    Fat  Use healthy oils, such as olive oil or canola oil, for cooking, salad dressings, etc.  Unflavored nuts and seeds  Nut butters (no added sugar)    Dairy  Yogurt (no sugar added)  Cheese  Low-fat milk  Unsweetened nondairy milks (almond milk, soy milk, etc)    Fruit  Fresh Fruit  Frozen fruit with no added sugar  Canned fruit with no added sugar  Dried fruit with no added sugar  100% fruit juice    Whole Grains  Single ingredient grains, such as oats, quinoa, brown rice  Whole-wheat pasta  Sprouted whole-grain bread    What to avoid:  - Avoid fried foods  - Avoid foods with added sugar  - Avoid sugar-sweetened beverages  - Avoid ultra-processed foods      Lifestyle Activity    Lifestyle activity consists of all the activities that burn calories during the course of a normal day. Using the stairs, washing the dishes, or even getting up to turn off the television are all examples of lifestyle activity. All activities, no matter how small, burn calories. Increasing lifestyle activity can help with weight control, so building physical activity into your everyday routine is important.     A pedometer is a tool that can help you track how much lifestyle activity you are getting. It can help you stay active. A pedometer counts each step you take and displays your total steps on the screen. Many smartphones, including [employer] and Androids, have applications that automatically count your steps. If you decide to use this method, make sure you are holding or wearing your smartphone so it can count all of your steps.     During the next 2 weeks, aim  for 5,000 or more steps per day. Each week aim to increase your daily step goal by 250 so that you will reach an average of 10,000 steps per day (equal to walking 4 to 5 miles).     Start making more active choices in your routine in order to increase the amount of walking you do each day.     Strategies to Increase Lifestyle Activity:    Take several 5-10-minute walks during the day.   Set a reminder to take a 5 minute break from your desk every hour.   Choose the farthest entrance to a building that you are entering.   Host walking meetings at work.   Walk down the anne to contact co-workers face-to-face, instead of emailing or calling.  Walk to a restroom, water cooler, or copy machine on a different floor at work.   Walk during your lunch break.   Park farther away in parking lots.   Get off the bus or train earlier and walk farther to your destination.   Take the stairs rather than the elevator or the escalator.   Start a walking club with co-workers or neighbors.   Walk - don't drive - for trips less than one mile.   Take an after-dinner walk with family.   Go for a walk while talking on your wireless phone.   Walk the dog more often.   If you prefer to stay indoors because of the weather, try walking in a shopping mall or doing laps around a large store.   Purchase a treadmill to use at home.   Schedule time for walking every week and stick to it like any other appointment.   Or think of your own strategy: _______________________________       Physical Activity    Physical activity improves your health and helps with weight control, especially weight loss maintenance.      When starting to incorporate an exercise routine into your day, it is helpful to set specific goals.  For example, I will walk at moderate intensity from 12:30-12:45pm on Monday, Wednesday, and Friday.  Schedule your exercise time into your calendar.  Think of any potential barriers that may come up and prevent you from exercising.   Develop solutions or back-up plans for when these barriers may arise.    Walking is an ideal activity to start with if you do not currently have an exercise routine. You can make the activity more fun by doing it with a friend or listening to music or a book on tape while you do it. If you don't enjoy walking, think of other activities you like, such as bike riding or swimming.  Other alternatives include group fitness classes or exercise at home using DVDs, Apps, etc.    If you are new to exercising, then start with 10 minutes 3-4 days per week.  After two weeks, increase to 15 minutes 3-4 days per week.  If you already exercise more than this, continue at your higher level, or increase by 10-15 minutes if able.         Aerobic Activity  Aerobic Activity gets you breathing harder and your heart beating faster.  Eventually, you should work up to 150 - 300 minutes of moderate-intensity aerobic activity every week or 75 - 150 minutes of vigorous-intensity aerobic activity every week.  An easy way to gauge the intensity of your activity is with the Talk Test.  During moderate activity, you should be able to talk, but not sing without having to pause for a breath.  During vigorous activity, you shouldn't be able to say more than a few words without pausing for a breath.  You should not push yourself until you are completely out of breath, tired, or sore.    Examples of Aerobic Activity:  Walking  Running  Swimming  Biking  Playing sports like tennis or basketball  Dancing  Jumping Rope      Strength Training  It is also recommended that you perform muscle-strengthening activities at least 2 days per week.  Be sure to include activities that work all major muscle groups (legs, arms, abdomen, back, buttocks, chest, and shoulders)    Examples of Strength Training  Lifting weights  Working with resistance band  Using your body weight for resistance (squats, push-ups,  sit-ups)  Pilates  Yoga      https://health.gov/moveyourway/activity-planner/      Remember to keep a record of your activity to track your progress.

## 2024-07-22 ENCOUNTER — PATIENT MESSAGE (OUTPATIENT)
Dept: BARIATRICS | Facility: CLINIC | Age: 59
End: 2024-07-22
Payer: MEDICARE

## 2024-08-15 ENCOUNTER — OFFICE VISIT (OUTPATIENT)
Dept: SPORTS MEDICINE | Facility: CLINIC | Age: 59
End: 2024-08-15
Payer: MEDICARE

## 2024-08-15 DIAGNOSIS — M17.12 PRIMARY OSTEOARTHRITIS OF LEFT KNEE: ICD-10-CM

## 2024-08-15 DIAGNOSIS — M17.11 PRIMARY OSTEOARTHRITIS OF RIGHT KNEE: Primary | ICD-10-CM

## 2024-08-15 NOTE — PROGRESS NOTES
Telemedicine/Virtual Visit Documentation:     The patient location is: home- Follow up from bilateral Synvisc One injections on 5/16/24. She reports significant relief of pain and improvement of function for approximately one month. She now reports that pain has returned to baseline. She is not interested in repeating the HA injection at this time or any steroid injections. She has recently seen bariatrics and would like to continue to work on weight loss before moving forward with anything else.    The chief complaint leading to consultation is: see HPI from 5/2/24  Patient presents to clinic with bilateral knee pain x 2-3 years. Denies a specific LESA. R>L. Pain is located along the medial and taj-patellar aspect of her knees. Denies any mechanical symptoms or instability. She reports occasional effusions which is associated with increased activity. She has been taking NSAIDS, using voltaren gel, and Tylenol as needed for pain. Denies any previous history of surgery or injections into her knee. She reports having completed formal PT in the past with some relief. Pain in right knee is 7/10. Pain in left knee is 4/10. She is not currently working on weight loss. She is here today to discuss treatment options.     VISIT TYPE X   Virtual visit with synchronous audio and video X   Telephone E/M service      Total time spent with patient: see X evonne on chart below.   More than half of the time was spent counseling or coordinating care including prognosis, differential diagnosis, risks and benefits of treatment, instructions, compliance risk reductions     EST MINUTES X   89709 5    52625 10    31006 15 X   99214 25    59914 40    NEW     63133 10    32723 20    95743 30    29420 45    44028 60    PHONE      5-10    32799 11-20    39317 21-30      RADIOGRAPHS: 5/2/24  Bilateral knees:  FINDINGS:  Moderate tricompartment DJD changes particularly medial compartments and patellofemoral joints.  Less prominent DJD  changes tibia-fibular joints and remote posttraumatic hypertrophic change proximal right fibular shaft posterior cortex.  Mild size left suprapatellar joint effusion and no effusion contralateral side.    Assessment:  Primary osteoarthritis right knee   2.  Primary osteoarthritis left knee   3.  Obesity    Plan:  I made the decision to obtain old records of the patient including previous notes and imaging. I independently reviewed and interpreted the radiographs and/or MRIs today as well as prior imaging.  Discuss repeat viscosupplementation in the future versus CSI/Zilretta.  She would like to hold off at this time.  She would like to continue to work on weight loss with Bariatrics and diet changes.  RTC prn.  She was instructed to reach out to me via patient portal if she changes her mind.

## 2024-10-01 ENCOUNTER — OFFICE VISIT (OUTPATIENT)
Dept: PRIMARY CARE CLINIC | Facility: CLINIC | Age: 59
End: 2024-10-01
Payer: MEDICARE

## 2024-10-01 VITALS
HEIGHT: 63 IN | OXYGEN SATURATION: 98 % | SYSTOLIC BLOOD PRESSURE: 114 MMHG | DIASTOLIC BLOOD PRESSURE: 82 MMHG | TEMPERATURE: 98 F | WEIGHT: 255.75 LBS | BODY MASS INDEX: 45.32 KG/M2 | HEART RATE: 84 BPM

## 2024-10-01 DIAGNOSIS — Z51.89 FOLLOW-UP MEDICAL CARE REQUESTED BY PATIENT: ICD-10-CM

## 2024-10-01 DIAGNOSIS — M25.511 ACUTE PAIN OF RIGHT SHOULDER: Primary | ICD-10-CM

## 2024-10-01 PROCEDURE — 3074F SYST BP LT 130 MM HG: CPT | Mod: HCNC,CPTII,S$GLB, | Performed by: STUDENT IN AN ORGANIZED HEALTH CARE EDUCATION/TRAINING PROGRAM

## 2024-10-01 PROCEDURE — 1159F MED LIST DOCD IN RCRD: CPT | Mod: HCNC,CPTII,S$GLB, | Performed by: STUDENT IN AN ORGANIZED HEALTH CARE EDUCATION/TRAINING PROGRAM

## 2024-10-01 PROCEDURE — 99213 OFFICE O/P EST LOW 20 MIN: CPT | Mod: HCNC,S$GLB,, | Performed by: STUDENT IN AN ORGANIZED HEALTH CARE EDUCATION/TRAINING PROGRAM

## 2024-10-01 PROCEDURE — 99999 PR PBB SHADOW E&M-EST. PATIENT-LVL III: CPT | Mod: PBBFAC,HCNC,, | Performed by: STUDENT IN AN ORGANIZED HEALTH CARE EDUCATION/TRAINING PROGRAM

## 2024-10-01 PROCEDURE — 3008F BODY MASS INDEX DOCD: CPT | Mod: HCNC,CPTII,S$GLB, | Performed by: STUDENT IN AN ORGANIZED HEALTH CARE EDUCATION/TRAINING PROGRAM

## 2024-10-01 PROCEDURE — 3079F DIAST BP 80-89 MM HG: CPT | Mod: HCNC,CPTII,S$GLB, | Performed by: STUDENT IN AN ORGANIZED HEALTH CARE EDUCATION/TRAINING PROGRAM

## 2024-10-01 PROCEDURE — 3044F HG A1C LEVEL LT 7.0%: CPT | Mod: HCNC,CPTII,S$GLB, | Performed by: STUDENT IN AN ORGANIZED HEALTH CARE EDUCATION/TRAINING PROGRAM

## 2024-10-01 NOTE — PROGRESS NOTES
"Primary Care  Office Visit - In Person  10/1/2024      HPI    Patient is a 59 y.o.   Martha Zarate  has a past medical history of Obesity.    Patient presents with stabbing pain in left shoulder which radiates to left upper extremity and neck  Pain began several weeks ago but it is worsening  No injuries that she can recall   She has not noted any swelling   She denies any stiffness   She received flu vaccine recently in right upper extremity       Active Medications:  Current Outpatient Medications   Medication Instructions    ibuprofen (ADVIL,MOTRIN) 800 mg, 3 times daily    multivitamin capsule 1 capsule, Daily    omega-3 fatty acids-fish oil 340-1,000 mg Cap 1 capsule, Oral, Daily    topiramate (TOPAMAX) 25 mg, Oral, 2 times daily       Vitals:    10/01/24 1502   BP: 114/82   BP Location: Right arm   Patient Position: Sitting   Pulse: 84   Temp: 98.2 °F (36.8 °C)   SpO2: 98%   Weight: 116 kg (255 lb 11.7 oz)   Height: 5' 3" (1.6 m)       Physical Exam  Musculoskeletal:      Left shoulder: Tenderness present. No bony tenderness. Normal range of motion.      Comments: No swelling or warmth   Patient has normal ROM   Pain poorly localized           Assessment and Plan     1. Acute pain of right shoulder  2. Follow-up medical care requested by patient  Comments:   She does have tenderness but it is poorly localized. ROM normal   Recommended NSAID use prn  Orders:  -     Ambulatory referral/consult to Orthopedics; Future; Expected date: 10/08/2024        Previous labs, records, and notes reviewed and considered for their impact on clinical decision making today.                Upcoming Scheduled Appointments and Follow Up:    Future Appointments   Date Time Provider Department Center   10/1/2024  3:20 PM Enzo Sales MD [facility]       Follow Up [facility] Care (with who? when?): No follow-ups on file.      Extended Emergency Contact Information  Primary Emergency Contact: Bee Zarate " \[facility] [place] [facility]\"" of [place]  Mobile Phone: [phone]  Relation: Spouse      Enzo Sales MD   Internal Medicine  10/1/2024 - 3:08 PM    I spent a total of 20 minutes on the day of the visit.This includes face to face time and non-face to face time preparing to see the patient (eg, review of tests), obtaining and/or reviewing separately obtained history, documenting clinical information in the electronic or other health record, independently interpreting results and communicating results to the patient/family/caregiver, or care coordinator.    While patients have the right to access their medical record, it is essential to recognize that progress notes primarily serve as a means of communication among healthcare professionals.

## 2024-10-02 ENCOUNTER — TELEPHONE (OUTPATIENT)
Dept: SPORTS MEDICINE | Facility: CLINIC | Age: 59
End: 2024-10-02
Payer: MEDICARE

## 2024-10-02 NOTE — TELEPHONE ENCOUNTER
Called and spoke to patient.  She is coming in for right shoulder pain on 10/4/24 and will arrive 15 minutes early to get xray

## 2024-10-04 ENCOUNTER — OFFICE VISIT (OUTPATIENT)
Dept: SPORTS MEDICINE | Facility: CLINIC | Age: 59
End: 2024-10-04
Payer: MEDICARE

## 2024-10-04 ENCOUNTER — HOSPITAL ENCOUNTER (OUTPATIENT)
Dept: RADIOLOGY | Facility: HOSPITAL | Age: 59
Discharge: HOME | End: 2024-10-04
Attending: STUDENT IN AN ORGANIZED HEALTH CARE EDUCATION/TRAINING PROGRAM
Payer: MEDICARE

## 2024-10-04 VITALS
BODY MASS INDEX: 45.32 KG/M2 | SYSTOLIC BLOOD PRESSURE: 124 MMHG | HEIGHT: 63 IN | WEIGHT: 255.75 LBS | DIASTOLIC BLOOD PRESSURE: 76 MMHG | HEART RATE: 77 BPM

## 2024-10-04 DIAGNOSIS — Z51.89 FOLLOW-UP MEDICAL CARE REQUESTED BY PATIENT: ICD-10-CM

## 2024-10-04 DIAGNOSIS — G89.29 CHRONIC LEFT SHOULDER PAIN: Primary | ICD-10-CM

## 2024-10-04 DIAGNOSIS — N28.9 FUNCTION KIDNEY DECREASED: ICD-10-CM

## 2024-10-04 DIAGNOSIS — M25.512 CHRONIC LEFT SHOULDER PAIN: Primary | ICD-10-CM

## 2024-10-04 DIAGNOSIS — M25.512 LEFT SHOULDER PAIN, UNSPECIFIED CHRONICITY: ICD-10-CM

## 2024-10-04 DIAGNOSIS — M75.42 SUBACROMIAL IMPINGEMENT, LEFT: ICD-10-CM

## 2024-10-04 PROCEDURE — 99999 PR PBB SHADOW E&M-EST. PATIENT-LVL IV: CPT | Mod: PBBFAC,HCNC,, | Performed by: STUDENT IN AN ORGANIZED HEALTH CARE EDUCATION/TRAINING PROGRAM

## 2024-10-04 PROCEDURE — 73030 X-RAY EXAM OF SHOULDER: CPT | Mod: TC,HCNC,LT

## 2024-10-04 PROCEDURE — 73030 X-RAY EXAM OF SHOULDER: CPT | Mod: 26,HCNC,LT, | Performed by: RADIOLOGY

## 2024-10-04 RX ORDER — TRIAMCINOLONE ACETONIDE 40 MG/ML
40 INJECTION, SUSPENSION INTRA-ARTICULAR; INTRAMUSCULAR
Status: DISCONTINUED | OUTPATIENT
Start: 2024-10-04 | End: 2024-10-04 | Stop reason: HOSPADM

## 2024-10-04 RX ADMIN — TRIAMCINOLONE ACETONIDE 40 MG: 40 INJECTION, SUSPENSION INTRA-ARTICULAR; INTRAMUSCULAR at 10:10

## 2024-10-04 NOTE — PROCEDURES
Large Joint Aspiration/Injection: L subacromial bursa    Date/Time: 10/4/2024 10:30 AM    Performed by: Ellen Fritz MD  Authorized by: Ellen Fritz MD    Consent Done?:  Yes (Verbal)  Indications:  Pain  Site marked: the procedure site was marked    Timeout: prior to procedure the correct patient, procedure, and site was verified    Prep: patient was prepped and draped in usual sterile fashion      Local anesthesia used?: Yes    Local anesthetic:  Co-phenylcaine spray    Details:  Needle Size:  21 G  Ultrasonic Guidance for needle placement?: Yes (Ultrasound guidance used to avoid neurovascular injury.)    Images are saved and documented.  Approach:  Lateral  Location:  Shoulder  Site:  L subacromial bursa  Medications:  40 mg triamcinolone acetonide 40 mg/mL  Medications comment:  Ropivacaine 0.2% 2mL  Patient tolerance:  Patient tolerated the procedure well with no immediate complications     TECHNIQUE: Real time ultrasound examination of the left subacromial bursa(e) was performed with SonZeroFOXte Edge 2, 9-L MHz linear probe(s). Ultrasound guidance was used for needle localization. Images were saved and stored for documentation.  Dynamic visualization of the needle was continuous throughout the procedures and maintained in good position.
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
## 2024-11-21 DIAGNOSIS — M17.12 PRIMARY OSTEOARTHRITIS OF LEFT KNEE: ICD-10-CM

## 2024-11-21 DIAGNOSIS — M17.11 PRIMARY OSTEOARTHRITIS OF RIGHT KNEE: Primary | ICD-10-CM

## 2024-11-21 NOTE — PROGRESS NOTES
We have discussed a variety of treatment options including medications, injections, physical therapy and other alternative treatments. I also explained the indications, risks and benefits of surgery.  Patient's pain is refractory HEP, conservative management, and NSAIDs. Pt would like to proceed with visco-supplementation.    Medical Necessity for viscosupplementation use: After thorough evaluation of the patient, I have determined that viscosupplementation treatment is medically necessary. The patient has painful degenerative joint disease (DJD) of the knee(s) with failure of conservative treatments including lifestyle modifications and rehabilitation exercises. Oral analgesics including NSAIDs have not adequately controlled the patient's symptoms. There is radiographic evidence of Kellgren-Griffin grade II (or greater) osteoarthritic (OA) changes, or if lack of radiographic evidence, there is arthroscopic or other evidence of chondrosis of the knee(s).     I made the decision to obtain old records of the patient including previous notes and imaging. I independently reviewed and interpreted lab results today as well as prior imaging.        1. Pre-authorization placed for bilateral knee Synvisc-One injections.  2. Ice compress to the affected area 2-3x a day for 15-20 minutes as needed for pain management.  3. RTC to see Sara Mayes PA-C for bilateral Synvisc-One injections.    All of the patient's questions were answered and the patient will contact us if they have any questions or concerns in the interim.

## 2024-12-02 ENCOUNTER — OFFICE VISIT (OUTPATIENT)
Dept: SPORTS MEDICINE | Facility: CLINIC | Age: 59
End: 2024-12-02
Payer: MEDICARE

## 2024-12-02 VITALS
SYSTOLIC BLOOD PRESSURE: 132 MMHG | DIASTOLIC BLOOD PRESSURE: 81 MMHG | BODY MASS INDEX: 44.91 KG/M2 | HEART RATE: 73 BPM | WEIGHT: 253.5 LBS

## 2024-12-02 DIAGNOSIS — M17.12 PRIMARY OSTEOARTHRITIS OF LEFT KNEE: ICD-10-CM

## 2024-12-02 DIAGNOSIS — M17.11 PRIMARY OSTEOARTHRITIS OF RIGHT KNEE: Primary | ICD-10-CM

## 2024-12-02 PROCEDURE — 99999 PR PBB SHADOW E&M-EST. PATIENT-LVL III: CPT | Mod: PBBFAC,HCNC,, | Performed by: PHYSICIAN ASSISTANT

## 2024-12-02 PROCEDURE — 99499 UNLISTED E&M SERVICE: CPT | Mod: HCNC,S$GLB,, | Performed by: PHYSICIAN ASSISTANT

## 2024-12-02 PROCEDURE — 20610 DRAIN/INJ JOINT/BURSA W/O US: CPT | Mod: 50,HCNC,S$GLB, | Performed by: PHYSICIAN ASSISTANT

## 2024-12-02 NOTE — PROGRESS NOTES
Patient is here for follow up of bilateral knee arthritis. Pt is requesting bilateral knee Synvisc one.  [facility] Has failed other conservative modalities including NSAIDS, activity modification, weight loss.    The prior shot was tolerated well.    PHYSICAL EXAMINATION:     General: The patient is alert and oriented x 3. Mood is pleasant.   Observation of ears, eyes and nose reveals no gross abnormalities. No   labored breathing observed.     No signs of infection or adverse reaction to knee.    PROCEDURE NOTE:  Injection Procedure bilateral knee  A time out was performed, including verification of patient ID, procedure, site and side, availability of information and equipment, review of safety issues, and agreement with consent, the procedure site was marked.    After time out was performed, the patient was prepped aseptically with povidone-iodine swabsticks. A diagnostic and therapeutic injection of 6cc SynviscOne was given under sterile technique using a 22g x 1.5 needle from the anterolateral aspect of the bilateral Knee Joint in the sitting position.      Martha Zarate had no adverse reactions to the medication. Pain decreased. She was instructed to apply ice to the joint for 20 minutes and avoid strenuous activities for 24-36 hours following the injection. She was warned of possible blood sugar and/or blood pressure changes during that time. Following that time, she can resume regular activities.    She was reminded to call the clinic immediately for any adverse side effects as explained in clinic today.      RTC in 6 months with Sara Mayes PA-C for possible repeat visco supplementation.  All questions were answered, pt will contact us for questions or concerns in the interim.

## 2024-12-03 ENCOUNTER — PATIENT MESSAGE (OUTPATIENT)
Dept: PRIMARY CARE CLINIC | Facility: CLINIC | Age: 59
End: 2024-12-03
Payer: MEDICARE

## 2025-02-24 DIAGNOSIS — Z00.00 ENCOUNTER FOR MEDICARE ANNUAL WELLNESS EXAM: ICD-10-CM

## 2025-06-02 DIAGNOSIS — M17.11 PRIMARY OSTEOARTHRITIS OF RIGHT KNEE: Primary | ICD-10-CM

## 2025-06-02 DIAGNOSIS — M17.12 PRIMARY OSTEOARTHRITIS OF LEFT KNEE: ICD-10-CM

## 2025-06-04 ENCOUNTER — LAB VISIT (OUTPATIENT)
Dept: LAB | Facility: HOSPITAL | Age: 60
End: 2025-06-04
Attending: STUDENT IN AN ORGANIZED HEALTH CARE EDUCATION/TRAINING PROGRAM
Payer: MEDICARE

## 2025-06-04 ENCOUNTER — PATIENT MESSAGE (OUTPATIENT)
Dept: SPORTS MEDICINE | Facility: CLINIC | Age: 60
End: 2025-06-04
Payer: MEDICARE

## 2025-06-04 ENCOUNTER — OFFICE VISIT (OUTPATIENT)
Dept: PRIMARY CARE CLINIC | Facility: CLINIC | Age: 60
End: 2025-06-04
Payer: MEDICARE

## 2025-06-04 VITALS
WEIGHT: 262.38 LBS | TEMPERATURE: 98 F | HEART RATE: 73 BPM | SYSTOLIC BLOOD PRESSURE: 132 MMHG | HEIGHT: 63 IN | BODY MASS INDEX: 46.49 KG/M2 | DIASTOLIC BLOOD PRESSURE: 80 MMHG | OXYGEN SATURATION: 98 %

## 2025-06-04 DIAGNOSIS — E78.2 MIXED HYPERLIPIDEMIA: ICD-10-CM

## 2025-06-04 DIAGNOSIS — Z13.1 SCREENING FOR DIABETES MELLITUS: ICD-10-CM

## 2025-06-04 DIAGNOSIS — E66.01 CLASS 3 SEVERE OBESITY DUE TO EXCESS CALORIES WITHOUT SERIOUS COMORBIDITY WITH BODY MASS INDEX (BMI) OF 45.0 TO 49.9 IN ADULT: ICD-10-CM

## 2025-06-04 DIAGNOSIS — R94.4 DECREASED GFR: ICD-10-CM

## 2025-06-04 DIAGNOSIS — D53.9 ANEMIA ASSOCIATED WITH NUTRITIONAL DEFICIENCY: ICD-10-CM

## 2025-06-04 DIAGNOSIS — Z00.00 ENCOUNTER FOR PREVENTATIVE ADULT HEALTH CARE EXAMINATION: Primary | ICD-10-CM

## 2025-06-04 DIAGNOSIS — E66.813 CLASS 3 SEVERE OBESITY DUE TO EXCESS CALORIES WITHOUT SERIOUS COMORBIDITY WITH BODY MASS INDEX (BMI) OF 45.0 TO 49.9 IN ADULT: ICD-10-CM

## 2025-06-04 DIAGNOSIS — Z00.00 ENCOUNTER FOR PREVENTATIVE ADULT HEALTH CARE EXAMINATION: ICD-10-CM

## 2025-06-04 DIAGNOSIS — D64.9 NORMOCYTIC ANEMIA: ICD-10-CM

## 2025-06-04 LAB
ALBUMIN SERPL BCP-MCNC: 4 G/DL (ref 3.5–5.2)
ALP SERPL-CCNC: 96 UNIT/L (ref 40–150)
ALT SERPL W/O P-5'-P-CCNC: 13 UNIT/L (ref 10–44)
ANION GAP (OHS): 10 MMOL/L (ref 8–16)
AST SERPL-CCNC: 18 UNIT/L (ref 11–45)
BILIRUB SERPL-MCNC: 0.2 MG/DL (ref 0.1–1)
BUN SERPL-MCNC: 16 MG/DL (ref 6–20)
CALCIUM SERPL-MCNC: 9.4 MG/DL (ref 8.7–10.5)
CHLORIDE SERPL-SCNC: 107 MMOL/L (ref 95–110)
CHOLEST SERPL-MCNC: 247 MG/DL (ref 120–199)
CHOLEST/HDLC SERPL: 3.3 {RATIO} (ref 2–5)
CO2 SERPL-SCNC: 26 MMOL/L (ref 23–29)
CREAT SERPL-MCNC: 1 MG/DL (ref 0.5–1.4)
EAG (OHS): 103 MG/DL (ref 68–131)
ERYTHROCYTE [DISTWIDTH] IN BLOOD BY AUTOMATED COUNT: 13.2 % (ref 11.5–14.5)
FERRITIN SERPL-MCNC: 59 NG/ML (ref 20–300)
FOLATE SERPL-MCNC: 15.7 NG/ML (ref 4–24)
GFR SERPLBLD CREATININE-BSD FMLA CKD-EPI: >60 ML/MIN/1.73/M2
GLUCOSE SERPL-MCNC: 79 MG/DL (ref 70–110)
HBA1C MFR BLD: 5.2 % (ref 4–5.6)
HCT VFR BLD AUTO: 39.7 % (ref 37–48.5)
HDLC SERPL-MCNC: 74 MG/DL (ref 40–75)
HDLC SERPL: 30 % (ref 20–50)
HGB BLD-MCNC: 12.1 GM/DL (ref 12–16)
LDLC SERPL CALC-MCNC: 143.2 MG/DL (ref 63–159)
MCH RBC QN AUTO: 28.9 PG (ref 27–31)
MCHC RBC AUTO-ENTMCNC: 30.5 G/DL (ref 32–36)
MCV RBC AUTO: 95 FL (ref 82–98)
NONHDLC SERPL-MCNC: 173 MG/DL
PLATELET # BLD AUTO: 291 K/UL (ref 150–450)
PMV BLD AUTO: 10.4 FL (ref 9.2–12.9)
POTASSIUM SERPL-SCNC: 4.8 MMOL/L (ref 3.5–5.1)
PROT SERPL-MCNC: 7.9 GM/DL (ref 6–8.4)
RBC # BLD AUTO: 4.18 M/UL (ref 4–5.4)
SODIUM SERPL-SCNC: 143 MMOL/L (ref 136–145)
TRIGL SERPL-MCNC: 149 MG/DL (ref 30–150)
TSH SERPL-ACNC: 1.01 UIU/ML (ref 0.4–4)
VIT B12 SERPL-MCNC: 416 PG/ML (ref 210–950)
WBC # BLD AUTO: 5.12 K/UL (ref 3.9–12.7)

## 2025-06-04 PROCEDURE — 84443 ASSAY THYROID STIM HORMONE: CPT

## 2025-06-04 PROCEDURE — 80053 COMPREHEN METABOLIC PANEL: CPT

## 2025-06-04 PROCEDURE — 82746 ASSAY OF FOLIC ACID SERUM: CPT

## 2025-06-04 PROCEDURE — 83036 HEMOGLOBIN GLYCOSYLATED A1C: CPT

## 2025-06-04 PROCEDURE — 85027 COMPLETE CBC AUTOMATED: CPT

## 2025-06-04 PROCEDURE — 82728 ASSAY OF FERRITIN: CPT

## 2025-06-04 PROCEDURE — 82607 VITAMIN B-12: CPT

## 2025-06-04 PROCEDURE — 80061 LIPID PANEL: CPT

## 2025-06-04 PROCEDURE — 99999 PR PBB SHADOW E&M-EST. PATIENT-LVL III: CPT | Mod: PBBFAC,,, | Performed by: STUDENT IN AN ORGANIZED HEALTH CARE EDUCATION/TRAINING PROGRAM

## 2025-06-04 PROCEDURE — 36415 COLL VENOUS BLD VENIPUNCTURE: CPT | Mod: PN

## 2025-06-05 ENCOUNTER — TELEPHONE (OUTPATIENT)
Dept: SPORTS MEDICINE | Facility: CLINIC | Age: 60
End: 2025-06-05
Payer: MEDICARE

## 2025-06-05 ENCOUNTER — RESULTS FOLLOW-UP (OUTPATIENT)
Dept: PRIMARY CARE CLINIC | Facility: CLINIC | Age: 60
End: 2025-06-05

## 2025-06-11 ENCOUNTER — PATIENT MESSAGE (OUTPATIENT)
Dept: BARIATRICS | Facility: CLINIC | Age: 60
End: 2025-06-11
Payer: MEDICARE

## 2025-06-11 ENCOUNTER — OFFICE VISIT (OUTPATIENT)
Dept: PRIMARY CARE CLINIC | Facility: CLINIC | Age: 60
End: 2025-06-11
Payer: MEDICARE

## 2025-06-11 ENCOUNTER — PATIENT MESSAGE (OUTPATIENT)
Dept: PRIMARY CARE CLINIC | Facility: CLINIC | Age: 60
End: 2025-06-11

## 2025-06-11 DIAGNOSIS — D50.8 IRON DEFICIENCY ANEMIA SECONDARY TO INADEQUATE DIETARY IRON INTAKE: ICD-10-CM

## 2025-06-11 DIAGNOSIS — E78.2 MIXED HYPERLIPIDEMIA: ICD-10-CM

## 2025-06-11 DIAGNOSIS — Z51.89 FOLLOW-UP MEDICAL CARE REQUESTED BY PATIENT: Primary | ICD-10-CM

## 2025-06-11 PROCEDURE — 98004 SYNCH AUDIO-VIDEO EST SF 10: CPT | Mod: 95,,, | Performed by: STUDENT IN AN ORGANIZED HEALTH CARE EDUCATION/TRAINING PROGRAM

## 2025-06-11 PROCEDURE — 3044F HG A1C LEVEL LT 7.0%: CPT | Mod: CPTII,95,, | Performed by: STUDENT IN AN ORGANIZED HEALTH CARE EDUCATION/TRAINING PROGRAM

## 2025-06-11 NOTE — PROGRESS NOTES
Telehealth Visit    The patient location is: [place]   The chief complaint leading to consultation is: Lab follow up     Visit type: audiovisual    Face to Face time with patient: 5 minutes  0 minutes of total time spent on the encounter, which includes face to face time and non-face to face time preparing to see the patient (eg, review of tests), Obtaining and/or reviewing separately obtained history, Documenting clinical information in the electronic or other health record, Independently interpreting results (not separately reported) and communicating results to the patient/family/caregiver, or Care coordination (not separately reported).       HPI    Patient is a 59 y.o.   Martha Zarate  has a past medical history of Obesity.      Patient presenting for lab review    Active Medications:  Current Outpatient Medications   Medication Instructions    multivitamin capsule 1 capsule, Daily    omega-3 fatty acids-fish oil 340-1,000 mg Cap 1 capsule, Oral, Daily       Physical Exam    General: Does not appear to be in acute distress    1. Follow-up medical care requested by patient    2. Mixed hyperlipidemia  Improved.  Continue diet changes.    3. Iron deficiency anemia secondary to inadequate dietary iron intake  Anemia has resolved.                 Upcoming Scheduled Appointments and Follow Up:    Future Appointments   Date Time Provider Department Center   6/19/2025 11:40 AM [id number]   6/25/2025  9:00 AM Sara Mayes PA-C [facility]   8/14/2025  9:00 AM Melanie Lopez PA-C [facility]       Follow Up [facility] (with who? when?): No follow-ups on file.        Extended Emergency Contact Information  Primary Emergency Contact: Bee Zarate   [place]  Mobile Phone: [phone]  Relation: Spouse      Enzo Sales MD   Internal Medicine  6/11/2025 - 11:37 AM        Each patient to whom he or she provides medical services by  telemedicine is:  (1) informed of the relationship between the physician and patient and the respective role of any other health care provider with respect to management of the patient; and (2) notified that he or she may decline to receive medical services by telemedicine and may withdraw from such care at any time.    This note was generated with the assistance of ambient listening technology. Verbal consent was obtained by the patient and accompanying visitor(s) for the recording of patient appointment to facilitate this note. I attest to having reviewed and edited the generated note for accuracy, though some syntax or spelling errors may persist. Please contact the author of this note for any clarification.      While patients have the right to access their medical record, it is essential to recognize that progress notes primarily serve as a means of communication among healthcare professionals.

## 2025-06-17 ENCOUNTER — HOSPITAL ENCOUNTER (OUTPATIENT)
Dept: RADIOLOGY | Facility: HOSPITAL | Age: 60
Discharge: HOME | End: 2025-06-17
Attending: STUDENT IN AN ORGANIZED HEALTH CARE EDUCATION/TRAINING PROGRAM
Payer: MEDICARE

## 2025-06-17 DIAGNOSIS — Z12.31 ENCOUNTER FOR SCREENING MAMMOGRAM FOR BREAST CANCER: ICD-10-CM

## 2025-06-17 PROCEDURE — 77067 SCR MAMMO BI INCL CAD: CPT | Mod: 26,,, | Performed by: RADIOLOGY

## 2025-06-17 PROCEDURE — 77063 BREAST TOMOSYNTHESIS BI: CPT | Mod: 26,,, | Performed by: RADIOLOGY

## 2025-06-17 PROCEDURE — 77063 BREAST TOMOSYNTHESIS BI: CPT | Mod: TC

## 2025-06-19 ENCOUNTER — OFFICE VISIT (OUTPATIENT)
Dept: BARIATRICS | Facility: CLINIC | Age: 60
End: 2025-06-19
Payer: MEDICARE

## 2025-06-19 ENCOUNTER — RESULTS FOLLOW-UP (OUTPATIENT)
Dept: PRIMARY CARE CLINIC | Facility: CLINIC | Age: 60
End: 2025-06-19

## 2025-06-19 VITALS
SYSTOLIC BLOOD PRESSURE: 135 MMHG | OXYGEN SATURATION: 97 % | BODY MASS INDEX: 46.37 KG/M2 | HEIGHT: 63 IN | WEIGHT: 261.69 LBS | HEART RATE: 91 BPM | DIASTOLIC BLOOD PRESSURE: 86 MMHG

## 2025-06-19 DIAGNOSIS — E66.813 CLASS 3 SEVERE OBESITY DUE TO EXCESS CALORIES WITHOUT SERIOUS COMORBIDITY WITH BODY MASS INDEX (BMI) OF 45.0 TO 49.9 IN ADULT: Primary | ICD-10-CM

## 2025-06-19 DIAGNOSIS — Z71.3 ENCOUNTER FOR WEIGHT LOSS COUNSELING: ICD-10-CM

## 2025-06-19 DIAGNOSIS — E66.01 CLASS 3 SEVERE OBESITY DUE TO EXCESS CALORIES WITHOUT SERIOUS COMORBIDITY WITH BODY MASS INDEX (BMI) OF 45.0 TO 49.9 IN ADULT: Primary | ICD-10-CM

## 2025-06-19 PROCEDURE — 3008F BODY MASS INDEX DOCD: CPT | Mod: CPTII,S$GLB,, | Performed by: STUDENT IN AN ORGANIZED HEALTH CARE EDUCATION/TRAINING PROGRAM

## 2025-06-19 PROCEDURE — 99999 PR PBB SHADOW E&M-EST. PATIENT-LVL III: CPT | Mod: PBBFAC,,, | Performed by: STUDENT IN AN ORGANIZED HEALTH CARE EDUCATION/TRAINING PROGRAM

## 2025-06-19 PROCEDURE — 1159F MED LIST DOCD IN RCRD: CPT | Mod: CPTII,S$GLB,, | Performed by: STUDENT IN AN ORGANIZED HEALTH CARE EDUCATION/TRAINING PROGRAM

## 2025-06-19 PROCEDURE — 99213 OFFICE O/P EST LOW 20 MIN: CPT | Mod: S$GLB,,, | Performed by: STUDENT IN AN ORGANIZED HEALTH CARE EDUCATION/TRAINING PROGRAM

## 2025-06-19 PROCEDURE — 3075F SYST BP GE 130 - 139MM HG: CPT | Mod: CPTII,S$GLB,, | Performed by: STUDENT IN AN ORGANIZED HEALTH CARE EDUCATION/TRAINING PROGRAM

## 2025-06-19 PROCEDURE — 3044F HG A1C LEVEL LT 7.0%: CPT | Mod: CPTII,S$GLB,, | Performed by: STUDENT IN AN ORGANIZED HEALTH CARE EDUCATION/TRAINING PROGRAM

## 2025-06-19 PROCEDURE — 3079F DIAST BP 80-89 MM HG: CPT | Mod: CPTII,S$GLB,, | Performed by: STUDENT IN AN ORGANIZED HEALTH CARE EDUCATION/TRAINING PROGRAM

## 2025-06-19 PROCEDURE — 1160F RVW MEDS BY RX/DR IN RCRD: CPT | Mod: CPTII,S$GLB,, | Performed by: STUDENT IN AN ORGANIZED HEALTH CARE EDUCATION/TRAINING PROGRAM

## 2025-06-19 RX ORDER — TOPIRAMATE 25 MG/1
25 TABLET, FILM COATED ORAL 2 TIMES DAILY
Qty: 180 TABLET | Refills: 0 | Status: SHIPPED | OUTPATIENT
Start: 2025-06-19 | End: 2025-09-17

## 2025-06-19 NOTE — PROGRESS NOTES
Subjective     Patient ID: Martha Zarate is a 59 y.o. female.    Chief Complaint: Follow-up, Obesity, and Weight Check    Patient presents for treatment of obesity.     Co-morbidities     Negative for thyroid cancer  Negative for kidney stones       History of Weight Loss Efforts  No prior use of weight loss medications    Current Physical Activity  Limited due to knee pain    Current Eating Habits  Breakfast - eggs and pancakes  Lunch - salad  Dinner - [place] steak and mashed potatoes  Snacks - cookies  Beverages - diet coke, water    Medical Weight Loss  6/20/2024: 260.6 lbs, BMI 46.2, BFP 53.3%, .7 lbs, SMM 67.5 lbs, BMR 1563 kcal  6/19/2025: 261.7 lbs, BMI 43.6, BFP 51.3%, .3 lbs, SMM 71 lbs, BMR 1618 kcal      Review of Systems   Constitutional:  Negative for chills and fever.   Respiratory:  Negative for shortness of breath.    Cardiovascular:  Negative for chest pain and palpitations.   Gastrointestinal:  Negative for abdominal pain, nausea and vomiting.   Musculoskeletal:  Positive for arthralgias.   Neurological:  Negative for dizziness and light-headedness.   Psychiatric/Behavioral:  The patient is not nervous/anxious.           Objective    Latest Reference Range & Units 06/04/25 14:50   WBC 3.90 - 12.70 K/uL 5.12   RBC 4.00 - 5.40 M/uL 4.18   Hemoglobin 12.0 - 16.0 gm/dL 12.1   Hematocrit 37.0 - 48.5 % 39.7   MCV 82 - 98 fL 95   MCH 27.0 - 31.0 pg 28.9   MCHC 32.0 - 36.0 g/dL 30.5 (L)   RDW 11.5 - 14.5 % 13.2   Platelet Count 150 - 450 K/uL 291   MPV 9.2 - 12.9 fL 10.4   Ferritin 20.0 - 300.0 ng/mL 59.0   Folate 4.0 - 24.0 ng/mL 15.7   Vitamin B12 210 - 950 pg/mL 416   Sodium 136 - 145 mmol/L 143   Potassium 3.5 - 5.1 mmol/L 4.8   Chloride 95 - 110 mmol/L 107   CO2 23 - 29 mmol/L 26   Anion Gap 8 - 16 mmol/L 10   BUN 6 - 20 mg/dL 16   Creatinine 0.5 - 1.4 mg/dL 1.0   eGFR >60 mL/min/1.73/m2 >60   Glucose 70 - 110 mg/dL 79   Calcium 8.7 - 10.5 mg/dL 9.4   ALP 40 - 150 unit/L 96   PROTEIN  TOTAL 6.0 - 8.4 gm/dL 7.9   Albumin 3.5 - 5.2 g/dL 4.0   BILIRUBIN TOTAL 0.1 - 1.0 mg/dL 0.2   AST 11 - 45 unit/L 18   ALT 10 - 44 unit/L 13   Cholesterol Total 120 - 199 mg/dL 247 (H)   HDL 40 - 75 mg/dL 74   HDL/Cholesterol Ratio 20.0 - 50.0 % 30.0   Non-HDL Cholesterol mg/dL 173   Total Cholesterol/HDL Ratio 2.0 - 5.0  3.3   Triglycerides 30 - 150 mg/dL 149   LDL Cholesterol 63.0 - 159.0 mg/dL 143.2   Hemoglobin A1C External 4.0 - 5.6 % 5.2   Estimated Avg Glucose 68 - 131 mg/dL 103   TSH 0.400 - 4.000 uIU/mL 1.013   (L): Data is abnormally low  (H): Data is abnormally high    Vitals:    06/19/25 0938   BP: 135/86   Pulse: 91       Physical Exam  Vitals reviewed.   Constitutional:       General: She is not in acute distress.     Appearance: Normal appearance. She is obese. She is not ill-appearing, toxic-appearing or diaphoretic.   HENT:      Head: Normocephalic and atraumatic.   Cardiovascular:      Rate and Rhythm: Normal rate.   Pulmonary:      Effort: Pulmonary effort is normal. No respiratory distress.   Skin:     General: Skin is warm and dry.   Neurological:      Mental Status: She is alert and oriented to person, place, and time.            Assessment and Plan     1. Class 3 severe obesity due to excess calories without serious comorbidity with body mass index (BMI) of 45.0 to 49.9 in adult  -     topiramate (TOPAMAX) 25 MG tablet; Take 1 tablet (25 mg total) by mouth 2 (two) times daily.  Dispense: 180 tablet; Refill: 0    2. Encounter for weight loss counseling      - Topiramate 25 mg twice daily    - Log all food and beverage intake with a daily calorie goal of 1200 calories per day    - Moderate intensity aerobic exercise for 150 minutes per week

## 2025-06-25 ENCOUNTER — OFFICE VISIT (OUTPATIENT)
Dept: SPORTS MEDICINE | Facility: CLINIC | Age: 60
End: 2025-06-25
Payer: MEDICARE

## 2025-06-25 ENCOUNTER — HOSPITAL ENCOUNTER (OUTPATIENT)
Dept: RADIOLOGY | Facility: HOSPITAL | Age: 60
Discharge: HOME | End: 2025-06-25
Attending: PHYSICIAN ASSISTANT
Payer: MEDICARE

## 2025-06-25 VITALS
DIASTOLIC BLOOD PRESSURE: 77 MMHG | WEIGHT: 260.56 LBS | SYSTOLIC BLOOD PRESSURE: 113 MMHG | BODY MASS INDEX: 46.17 KG/M2 | HEART RATE: 71 BPM | HEIGHT: 63 IN

## 2025-06-25 DIAGNOSIS — M17.11 PRIMARY OSTEOARTHRITIS OF RIGHT KNEE: ICD-10-CM

## 2025-06-25 DIAGNOSIS — M25.562 PAIN IN BOTH KNEES, UNSPECIFIED CHRONICITY: ICD-10-CM

## 2025-06-25 DIAGNOSIS — G89.29 CHRONIC PAIN OF RIGHT KNEE: Primary | ICD-10-CM

## 2025-06-25 DIAGNOSIS — M25.561 PAIN IN BOTH KNEES, UNSPECIFIED CHRONICITY: ICD-10-CM

## 2025-06-25 DIAGNOSIS — M17.12 PRIMARY OSTEOARTHRITIS OF LEFT KNEE: ICD-10-CM

## 2025-06-25 DIAGNOSIS — M25.561 CHRONIC PAIN OF RIGHT KNEE: Primary | ICD-10-CM

## 2025-06-25 PROCEDURE — 73564 X-RAY EXAM KNEE 4 OR MORE: CPT | Mod: TC,50

## 2025-06-25 PROCEDURE — 99999 PR PBB SHADOW E&M-EST. PATIENT-LVL III: CPT | Mod: PBBFAC,,, | Performed by: PHYSICIAN ASSISTANT

## 2025-06-25 PROCEDURE — 20610 DRAIN/INJ JOINT/BURSA W/O US: CPT | Mod: RT,S$GLB,, | Performed by: PHYSICIAN ASSISTANT

## 2025-06-25 PROCEDURE — 3044F HG A1C LEVEL LT 7.0%: CPT | Mod: CPTII,S$GLB,, | Performed by: PHYSICIAN ASSISTANT

## 2025-06-25 PROCEDURE — 3008F BODY MASS INDEX DOCD: CPT | Mod: CPTII,S$GLB,, | Performed by: PHYSICIAN ASSISTANT

## 2025-06-25 PROCEDURE — 73564 X-RAY EXAM KNEE 4 OR MORE: CPT | Mod: 26,50,, | Performed by: RADIOLOGY

## 2025-06-25 PROCEDURE — 1160F RVW MEDS BY RX/DR IN RCRD: CPT | Mod: CPTII,S$GLB,, | Performed by: PHYSICIAN ASSISTANT

## 2025-06-25 PROCEDURE — 3074F SYST BP LT 130 MM HG: CPT | Mod: CPTII,S$GLB,, | Performed by: PHYSICIAN ASSISTANT

## 2025-06-25 PROCEDURE — 3078F DIAST BP <80 MM HG: CPT | Mod: CPTII,S$GLB,, | Performed by: PHYSICIAN ASSISTANT

## 2025-06-25 PROCEDURE — 99214 OFFICE O/P EST MOD 30 MIN: CPT | Mod: 25,S$GLB,, | Performed by: PHYSICIAN ASSISTANT

## 2025-06-25 PROCEDURE — 1159F MED LIST DOCD IN RCRD: CPT | Mod: CPTII,S$GLB,, | Performed by: PHYSICIAN ASSISTANT

## 2025-06-25 RX ORDER — TRIAMCINOLONE ACETONIDE 40 MG/ML
40 INJECTION, SUSPENSION INTRA-ARTICULAR; INTRAMUSCULAR
Status: COMPLETED | OUTPATIENT
Start: 2025-06-25 | End: 2025-06-25

## 2025-06-25 RX ORDER — BUPIVACAINE HYDROCHLORIDE 5 MG/ML
3 INJECTION, SOLUTION PERINEURAL
Status: COMPLETED | OUTPATIENT
Start: 2025-06-25 | End: 2025-06-25

## 2025-06-25 RX ADMIN — BUPIVACAINE HYDROCHLORIDE 15 MG: 5 INJECTION, SOLUTION PERINEURAL at 10:06

## 2025-06-25 RX ADMIN — TRIAMCINOLONE ACETONIDE 40 MG: 40 INJECTION, SUSPENSION INTRA-ARTICULAR; INTRAMUSCULAR at 10:06

## 2025-06-25 NOTE — PROGRESS NOTES
Subjective:          Chief Complaint: Martha Zarate is a 59 y.o. female who had concerns including Pain of the Left Knee and Pain of the Right Knee.    History of Present Illness    CHIEF COMPLAINT:  - Bilateral knee pain, worse on the right    HPI:  Martha presents for follow-up after bilateral Synvisc One gel injections administered on December 2nd, 2024. She reports bilateral knee pain, worse on the right. Her right knee pain is currently rated as 6/10, increasing to 7-8/10 when sitting or not moving. Pain improves with movement. She reports constant clicking and popping in her knees and occasional buckling while walking. She denies significant issues with her left knee at present.    The most recent bilateral Sinvisc One injections provided minimal relief, lasting approximately one week. Previous bilateral Sinvisc One injections on May 16th, 2024, provided significant relief of pain and improvement of function, but only for a month. She has never had steroid injections in her knees before.    She mentions having had a steroid injection in her shoulder from Dr. Fritz in the past, which provided good relief.    She is planning a two-week vacation to [place] on the 30th, which may impact her treatment considerations.    She denies difficulty going upstairs or previous steroid injections in the knees.         Pain  Pertinent negatives include no abdominal pain, chest pain, chills, congestion, coughing, fever, headaches, joint swelling, myalgias, nausea, numbness, rash, sore throat or vomiting.       Review of Systems   Constitutional: Negative. Negative for chills, fever, weight gain and weight loss.   HENT:  Negative for congestion and sore throat.    Eyes:  Negative for blurred vision and double vision.   Cardiovascular:  Negative for chest pain, leg swelling and palpitations.   Respiratory:  Negative for cough and shortness of breath.    Hematologic/Lymphatic: Does not bruise/bleed easily.   Skin:  Negative for  itching, poor wound healing and rash.   Musculoskeletal:  Positive for joint pain. Negative for back pain, joint swelling, muscle weakness, myalgias and stiffness.   Gastrointestinal:  Negative for abdominal pain, constipation, diarrhea, nausea and vomiting.   Genitourinary: Negative.  Negative for frequency and hematuria.   Neurological:  Negative for dizziness, headaches, numbness, paresthesias and sensory change.   Psychiatric/Behavioral:  Negative for altered mental status and depression. The patient is not nervous/anxious.    Allergic/Immunologic: Negative for hives.                   Objective:        General: Martha is well-developed, well-nourished, appears stated age, in no acute distress, alert and oriented to time, place and person.       General    Vitals reviewed.  Constitutional: She is oriented to person, place, and time. She appears well-developed and well-nourished. No distress.   HENT:   Head: Normocephalic and atraumatic.   Eyes: EOM are normal.   Cardiovascular:  Normal rate and regular rhythm.            Pulmonary/Chest: Effort normal. No respiratory distress.   Neurological: She is alert and oriented to person, place, and time. She has normal reflexes. No cranial nerve deficit. Coordination normal.   Psychiatric: She has a normal mood and affect. Her behavior is normal. Judgment and thought content normal.     General Musculoskeletal Exam   Gait: antalgic and abnormal       Right Knee Exam     Inspection   Erythema: absent  Scars: absent  Swelling: absent  Effusion: present  Deformity: present (genu varum)  Bruising: absent    Tenderness   The patient is tender to palpation of the medial joint line and patella.    Crepitus   The patient has crepitus of the patella.    Range of Motion   Extension:  10 abnormal   Flexion:  90 abnormal     Tests   Meniscus   Ingrid:  Medial - negative Lateral - negative  Ligament Examination   Lachman: normal (-1 to 2mm)   PCL-Posterior Drawer: normal (0 to 2mm)      MCL - Valgus: normal (0 to 2mm)  LCL - Varus: normal  Pivot Shift: normal (Equal)  Reverse Pivot Shift: normal (Equal)  Posterolateral Corner: stable  Patella   Passive Patellar Tilt: neutral  Patellar Tracking: normal  Patellar Glide (quadrants): Lateral - 1   Medial - 2  Patellar Grind: positive    Other   Sensation: normal    Left Knee Exam     Inspection   Erythema: absent  Scars: absent  Swelling: absent  Effusion: present  Deformity: present (genu varum)  Bruising: absent    Tenderness   The patient tender to palpation of the medial joint line and patella.    Crepitus   The patient has crepitus of the patella.    Range of Motion   Extension:  5 abnormal   Flexion:  90 abnormal     Tests   Meniscus   Ingrid:  Medial - negative Lateral - negative  Stability   Lachman: normal (-1 to 2mm)   PCL-Posterior Drawer: normal (0 to 2mm)  MCL - Valgus: normal (0 to 2mm)  LCL - Varus: normal (0 to 2mm)  Pivot Shift: normal (Equal)  Reverse Pivot Shift: normal (Equal)  Posterolateral Corner: stable  Patella   Passive Patellar Tilt: neutral  Patellar Tracking: normal  Patellar Glide (Quadrants): Lateral - 1 Medial - 2  Patellar Grind: positive    Other   Sensation: normal    Muscle Strength   Right Lower Extremity   Hip Abduction: 5/5   Quadriceps:  5/5   Hamstrin/5   Left Lower Extremity   Hip Abduction: 5/5   Quadriceps:  5/5   Hamstrin/5     Reflexes     Left Side  Achilles:  2+  Quadriceps:  2+    Right Side   Achilles:  2+  Quadriceps:  2+    Vascular Exam     Right Pulses  Dorsalis Pedis:      2+  Posterior Tibial:      2+        Left Pulses  Dorsalis Pedis:      2+  Posterior Tibial:      2+        RADIOGRAPHS:25  Bilateral knees:  FINDINGS:  Bilateral knees four views.     Right: There is DJD and a varus deformity.  No fracture dislocation bone destruction or OCD seen.     Left: There is DJD and a varus deformity.  No fracture dislocation bone destruction or OCD seen.      Assessment:            ICD-10-CM ICD-9-CM   1. Chronic pain of right knee  M25.561 719.46    G89.29 338.29   2. Primary osteoarthritis of right knee  M17.11 715.16   3. Primary osteoarthritis of left knee  M17.12 715.16       Plan:         Assessment & Plan    PROCEDURES:  -Injection Procedure right knee  A time out was performed, including verification of patient ID, procedure, site and side, availability of information and equipment, review of safety issues, and agreement with consent, the procedure site was marked.    After time out was performed, the patient was prepped aseptically with povidone-iodine swabsticks. A diagnostic and therapeutic injection of 1:3cc Kenalog/Marcaine was given under sterile technique using a 22g x 1.5 needle from the anterolateral  aspect of the right Knee Joint in the sitting position.      Martha Zarate had no adverse reactions to the medication. Pain decreased. She was instructed to apply ice to the joint for 20 minutes and avoid strenuous activities for 24-36 hours following the injection. She was warned of possible blood sugar and/or blood pressure changes during that time. Following that time, she can resume regular activities.    She was reminded to call the clinic immediately for any adverse side effects as explained in clinic today.    - Discussed potential future use of long-acting steroid (Zilretta) for the right knee if the current short-acting steroid provides good relief for a short duration  - Explained the difference between short-acting and long-acting steroids, including the mechanism of action and duration of effect for each.    IMAGING:  -I made the decision to obtain old records of the patient including previous notes and imaging. New imaging was ordered today of the extremity or extremities evaluated. I independently reviewed and interpreted the radiographs and/or MRIs today as well as prior imaging.  Reviewed with patient in detail.    FOLLOW UP:  - Keep updated via patient portal on  steroid injection effectiveness and duration.    PATIENT INSTRUCTIONS:  - Avoid soaking in tub today.  - Avoid swimming today.  - Take it easy for 2 days.  - Document relief duration and effectiveness in patient portal.           This note was generated with the assistance of ambient listening technology. Verbal consent was obtained by the patient and accompanying visitor(s) for the recording of patient appointment to facilitate this note. I attest to having reviewed and edited the generated note for accuracy, though some syntax or spelling errors may persist. Please contact the author of this note for any clarification.       [facility] patient questionnaires have been collected today.

## 2025-06-27 ENCOUNTER — PATIENT MESSAGE (OUTPATIENT)
Dept: BARIATRICS | Facility: CLINIC | Age: 60
End: 2025-06-27
Payer: MEDICARE

## 2025-07-13 ENCOUNTER — PATIENT MESSAGE (OUTPATIENT)
Dept: BARIATRICS | Facility: CLINIC | Age: 60
End: 2025-07-13
Payer: MEDICARE

## 2025-07-28 ENCOUNTER — PATIENT MESSAGE (OUTPATIENT)
Dept: BARIATRICS | Facility: CLINIC | Age: 60
End: 2025-07-28
Payer: MEDICARE

## 2025-07-28 ENCOUNTER — PATIENT MESSAGE (OUTPATIENT)
Dept: PRIMARY CARE CLINIC | Facility: CLINIC | Age: 60
End: 2025-07-28
Payer: MEDICARE

## 2025-08-19 ENCOUNTER — OFFICE VISIT (OUTPATIENT)
Dept: INTERNAL MEDICINE | Facility: CLINIC | Age: 60
End: 2025-08-19
Payer: MEDICARE

## 2025-08-19 ENCOUNTER — PATIENT MESSAGE (OUTPATIENT)
Dept: BARIATRICS | Facility: CLINIC | Age: 60
End: 2025-08-19
Payer: MEDICARE

## 2025-08-19 ENCOUNTER — TELEPHONE (OUTPATIENT)
Dept: BARIATRICS | Facility: CLINIC | Age: 60
End: 2025-08-19
Payer: MEDICARE

## 2025-08-19 VITALS
HEART RATE: 80 BPM | SYSTOLIC BLOOD PRESSURE: 118 MMHG | OXYGEN SATURATION: 97 % | BODY MASS INDEX: 46.49 KG/M2 | WEIGHT: 262.38 LBS | DIASTOLIC BLOOD PRESSURE: 86 MMHG | HEIGHT: 63 IN

## 2025-08-19 DIAGNOSIS — Z74.09 OTHER REDUCED MOBILITY: ICD-10-CM

## 2025-08-19 DIAGNOSIS — M15.0 PRIMARY OSTEOARTHRITIS INVOLVING MULTIPLE JOINTS: ICD-10-CM

## 2025-08-19 DIAGNOSIS — D50.8 IRON DEFICIENCY ANEMIA SECONDARY TO INADEQUATE DIETARY IRON INTAKE: ICD-10-CM

## 2025-08-19 DIAGNOSIS — E66.813 CLASS 3 SEVERE OBESITY DUE TO EXCESS CALORIES WITHOUT SERIOUS COMORBIDITY WITH BODY MASS INDEX (BMI) OF 45.0 TO 49.9 IN ADULT: ICD-10-CM

## 2025-08-19 DIAGNOSIS — E78.00 HYPERCHOLESTEREMIA: ICD-10-CM

## 2025-08-19 DIAGNOSIS — Z00.00 ENCOUNTER FOR MEDICARE ANNUAL WELLNESS EXAM: Primary | ICD-10-CM

## 2025-08-19 PROCEDURE — 3074F SYST BP LT 130 MM HG: CPT | Mod: CPTII,S$GLB,, | Performed by: NURSE PRACTITIONER

## 2025-08-19 PROCEDURE — 99999 PR PBB SHADOW E&M-EST. PATIENT-LVL IV: CPT | Mod: PBBFAC,,, | Performed by: NURSE PRACTITIONER

## 2025-08-19 PROCEDURE — 1159F MED LIST DOCD IN RCRD: CPT | Mod: CPTII,S$GLB,, | Performed by: NURSE PRACTITIONER

## 2025-08-19 PROCEDURE — 3079F DIAST BP 80-89 MM HG: CPT | Mod: CPTII,S$GLB,, | Performed by: NURSE PRACTITIONER

## 2025-08-19 PROCEDURE — 3044F HG A1C LEVEL LT 7.0%: CPT | Mod: CPTII,S$GLB,, | Performed by: NURSE PRACTITIONER

## 2025-08-19 PROCEDURE — G0439 PPPS, SUBSEQ VISIT: HCPCS | Mod: S$GLB,,, | Performed by: NURSE PRACTITIONER

## 2025-08-19 PROCEDURE — 1160F RVW MEDS BY RX/DR IN RCRD: CPT | Mod: CPTII,S$GLB,, | Performed by: NURSE PRACTITIONER

## 2025-08-20 ENCOUNTER — HOSPITAL ENCOUNTER (OUTPATIENT)
Dept: RADIOLOGY | Facility: HOSPITAL | Age: 60
Discharge: HOME | End: 2025-08-20
Attending: PHYSICIAN ASSISTANT
Payer: MEDICARE

## 2025-08-20 ENCOUNTER — OFFICE VISIT (OUTPATIENT)
Dept: INTERNAL MEDICINE | Facility: CLINIC | Age: 60
End: 2025-08-20
Payer: MEDICARE

## 2025-08-20 VITALS
BODY MASS INDEX: 47.15 KG/M2 | WEIGHT: 266.13 LBS | SYSTOLIC BLOOD PRESSURE: 122 MMHG | HEIGHT: 63 IN | HEART RATE: 77 BPM | OXYGEN SATURATION: 99 % | DIASTOLIC BLOOD PRESSURE: 80 MMHG

## 2025-08-20 DIAGNOSIS — M77.8 WRIST TENDONITIS: ICD-10-CM

## 2025-08-20 DIAGNOSIS — M25.531 RIGHT WRIST PAIN: Primary | ICD-10-CM

## 2025-08-20 DIAGNOSIS — M25.531 RIGHT WRIST PAIN: ICD-10-CM

## 2025-08-20 PROCEDURE — 99999 PR PBB SHADOW E&M-EST. PATIENT-LVL IV: CPT | Mod: PBBFAC,,, | Performed by: PHYSICIAN ASSISTANT

## 2025-08-20 PROCEDURE — 73100 X-RAY EXAM OF WRIST: CPT | Mod: TC,RT

## 2025-08-20 PROCEDURE — 99213 OFFICE O/P EST LOW 20 MIN: CPT | Mod: S$GLB,,, | Performed by: PHYSICIAN ASSISTANT

## 2025-08-20 PROCEDURE — 1160F RVW MEDS BY RX/DR IN RCRD: CPT | Mod: CPTII,S$GLB,, | Performed by: PHYSICIAN ASSISTANT

## 2025-08-20 PROCEDURE — 3044F HG A1C LEVEL LT 7.0%: CPT | Mod: CPTII,S$GLB,, | Performed by: PHYSICIAN ASSISTANT

## 2025-08-20 PROCEDURE — 73100 X-RAY EXAM OF WRIST: CPT | Mod: 26,RT,, | Performed by: RADIOLOGY

## 2025-08-20 PROCEDURE — 3008F BODY MASS INDEX DOCD: CPT | Mod: CPTII,S$GLB,, | Performed by: PHYSICIAN ASSISTANT

## 2025-08-20 PROCEDURE — 3079F DIAST BP 80-89 MM HG: CPT | Mod: CPTII,S$GLB,, | Performed by: PHYSICIAN ASSISTANT

## 2025-08-20 PROCEDURE — 1159F MED LIST DOCD IN RCRD: CPT | Mod: CPTII,S$GLB,, | Performed by: PHYSICIAN ASSISTANT

## 2025-08-20 PROCEDURE — 3074F SYST BP LT 130 MM HG: CPT | Mod: CPTII,S$GLB,, | Performed by: PHYSICIAN ASSISTANT

## 2025-08-20 RX ORDER — NAPROXEN 500 MG/1
500 TABLET ORAL 2 TIMES DAILY
Qty: 20 TABLET | Refills: 0 | Status: SHIPPED | OUTPATIENT
Start: 2025-08-20